# Patient Record
Sex: MALE | Race: NATIVE HAWAIIAN OR OTHER PACIFIC ISLANDER | Employment: UNEMPLOYED | URBAN - METROPOLITAN AREA
[De-identification: names, ages, dates, MRNs, and addresses within clinical notes are randomized per-mention and may not be internally consistent; named-entity substitution may affect disease eponyms.]

---

## 2021-09-11 ENCOUNTER — HOSPITAL ENCOUNTER (INPATIENT)
Age: 46
LOS: 2 days | Discharge: LEFT BEFORE DISCHARGE | DRG: 383 | End: 2021-09-14
Attending: INTERNAL MEDICINE | Admitting: INTERNAL MEDICINE
Payer: MEDICAID

## 2021-09-11 DIAGNOSIS — L03.113 CELLULITIS OF RIGHT UPPER EXTREMITY: Primary | ICD-10-CM

## 2021-09-11 PROCEDURE — 96368 THER/DIAG CONCURRENT INF: CPT

## 2021-09-11 PROCEDURE — 96375 TX/PRO/DX INJ NEW DRUG ADDON: CPT

## 2021-09-11 PROCEDURE — 96365 THER/PROPH/DIAG IV INF INIT: CPT

## 2021-09-11 PROCEDURE — 99284 EMERGENCY DEPT VISIT MOD MDM: CPT

## 2021-09-11 ASSESSMENT — PAIN DESCRIPTION - LOCATION: LOCATION: HAND

## 2021-09-11 ASSESSMENT — PAIN SCALES - GENERAL: PAINLEVEL_OUTOF10: 10

## 2021-09-11 ASSESSMENT — PAIN DESCRIPTION - DESCRIPTORS: DESCRIPTORS: SHARP;ACHING;PRESSURE

## 2021-09-11 ASSESSMENT — PAIN DESCRIPTION - ORIENTATION: ORIENTATION: RIGHT

## 2021-09-11 ASSESSMENT — PAIN DESCRIPTION - PAIN TYPE: TYPE: ACUTE PAIN

## 2021-09-11 ASSESSMENT — PAIN DESCRIPTION - FREQUENCY: FREQUENCY: CONTINUOUS

## 2021-09-12 ENCOUNTER — APPOINTMENT (OUTPATIENT)
Dept: MRI IMAGING | Age: 46
DRG: 383 | End: 2021-09-12
Payer: MEDICAID

## 2021-09-12 ENCOUNTER — APPOINTMENT (OUTPATIENT)
Dept: CT IMAGING | Age: 46
DRG: 383 | End: 2021-09-12
Payer: MEDICAID

## 2021-09-12 ENCOUNTER — APPOINTMENT (OUTPATIENT)
Dept: GENERAL RADIOLOGY | Age: 46
DRG: 383 | End: 2021-09-12
Payer: MEDICAID

## 2021-09-12 PROBLEM — L02.511 ABSCESS OF RIGHT HAND: Status: ACTIVE | Noted: 2021-09-12

## 2021-09-12 PROBLEM — M86.141 ACUTE OSTEOMYELITIS OF RIGHT HAND (HCC): Status: ACTIVE | Noted: 2021-09-12

## 2021-09-12 LAB
ALBUMIN SERPL-MCNC: 5.1 G/DL (ref 3.5–4.6)
ALP BLD-CCNC: 130 U/L (ref 35–104)
ALT SERPL-CCNC: 22 U/L (ref 0–41)
ANION GAP SERPL CALCULATED.3IONS-SCNC: 12 MEQ/L (ref 9–15)
AST SERPL-CCNC: 20 U/L (ref 0–40)
BASOPHILS ABSOLUTE: 0.1 K/UL (ref 0–0.2)
BASOPHILS RELATIVE PERCENT: 0.4 %
BILIRUB SERPL-MCNC: 0.3 MG/DL (ref 0.2–0.7)
BUN BLDV-MCNC: 14 MG/DL (ref 6–20)
CALCIUM SERPL-MCNC: 9.8 MG/DL (ref 8.5–9.9)
CHLORIDE BLD-SCNC: 99 MEQ/L (ref 95–107)
CO2: 28 MEQ/L (ref 20–31)
CREAT SERPL-MCNC: 0.76 MG/DL (ref 0.7–1.2)
EOSINOPHILS ABSOLUTE: 0.3 K/UL (ref 0–0.7)
EOSINOPHILS RELATIVE PERCENT: 1.7 %
GFR AFRICAN AMERICAN: >60
GFR NON-AFRICAN AMERICAN: >60
GLOBULIN: 3.4 G/DL (ref 2.3–3.5)
GLUCOSE BLD-MCNC: 93 MG/DL (ref 70–99)
HAV IGM SER IA-ACNC: ABNORMAL
HCT VFR BLD CALC: 43.4 % (ref 42–52)
HEMOGLOBIN: 14.6 G/DL (ref 14–18)
HEPATITIS B CORE IGM ANTIBODY: ABNORMAL
HEPATITIS B SURFACE ANTIGEN INTERPRETATION: ABNORMAL
HEPATITIS C ANTIBODY INTERPRETATION: REACTIVE
HEPATITIS INTERPRETATION:: ABNORMAL
LACTIC ACID: 1.1 MMOL/L (ref 0.5–2.2)
LYMPHOCYTES ABSOLUTE: 1.6 K/UL (ref 1–4.8)
LYMPHOCYTES RELATIVE PERCENT: 10.6 %
MCH RBC QN AUTO: 26.8 PG (ref 27–31.3)
MCHC RBC AUTO-ENTMCNC: 33.6 % (ref 33–37)
MCV RBC AUTO: 79.7 FL (ref 80–100)
MONOCYTES ABSOLUTE: 1 K/UL (ref 0.2–0.8)
MONOCYTES RELATIVE PERCENT: 6.9 %
NEUTROPHILS ABSOLUTE: 12.2 K/UL (ref 1.4–6.5)
NEUTROPHILS RELATIVE PERCENT: 80.4 %
PDW BLD-RTO: 14.1 % (ref 11.5–14.5)
PLATELET # BLD: 275 K/UL (ref 130–400)
POTASSIUM SERPL-SCNC: 4.3 MEQ/L (ref 3.4–4.9)
RBC # BLD: 5.44 M/UL (ref 4.7–6.1)
SODIUM BLD-SCNC: 139 MEQ/L (ref 135–144)
TOTAL PROTEIN: 8.5 G/DL (ref 6.3–8)
WBC # BLD: 15.1 K/UL (ref 4.8–10.8)

## 2021-09-12 PROCEDURE — 6360000004 HC RX CONTRAST MEDICATION: Performed by: INTERNAL MEDICINE

## 2021-09-12 PROCEDURE — 1210000000 HC MED SURG R&B

## 2021-09-12 PROCEDURE — 6370000000 HC RX 637 (ALT 250 FOR IP): Performed by: INTERNAL MEDICINE

## 2021-09-12 PROCEDURE — 73201 CT UPPER EXTREMITY W/DYE: CPT

## 2021-09-12 PROCEDURE — 93005 ELECTROCARDIOGRAM TRACING: CPT | Performed by: INTERNAL MEDICINE

## 2021-09-12 PROCEDURE — 80074 ACUTE HEPATITIS PANEL: CPT

## 2021-09-12 PROCEDURE — 6360000002 HC RX W HCPCS: Performed by: INTERNAL MEDICINE

## 2021-09-12 PROCEDURE — 2580000003 HC RX 258: Performed by: INTERNAL MEDICINE

## 2021-09-12 PROCEDURE — 2580000003 HC RX 258: Performed by: PHYSICIAN ASSISTANT

## 2021-09-12 PROCEDURE — 83605 ASSAY OF LACTIC ACID: CPT

## 2021-09-12 PROCEDURE — 85025 COMPLETE CBC W/AUTO DIFF WBC: CPT

## 2021-09-12 PROCEDURE — 73130 X-RAY EXAM OF HAND: CPT

## 2021-09-12 PROCEDURE — 36415 COLL VENOUS BLD VENIPUNCTURE: CPT

## 2021-09-12 PROCEDURE — 87040 BLOOD CULTURE FOR BACTERIA: CPT

## 2021-09-12 PROCEDURE — 80053 COMPREHEN METABOLIC PANEL: CPT

## 2021-09-12 PROCEDURE — 6360000002 HC RX W HCPCS: Performed by: PHYSICIAN ASSISTANT

## 2021-09-12 RX ORDER — ONDANSETRON 4 MG/1
4 TABLET, ORALLY DISINTEGRATING ORAL EVERY 8 HOURS PRN
Status: DISCONTINUED | OUTPATIENT
Start: 2021-09-12 | End: 2021-09-14 | Stop reason: HOSPADM

## 2021-09-12 RX ORDER — MORPHINE SULFATE 2 MG/ML
1 INJECTION, SOLUTION INTRAMUSCULAR; INTRAVENOUS EVERY 4 HOURS PRN
Status: DISCONTINUED | OUTPATIENT
Start: 2021-09-12 | End: 2021-09-14 | Stop reason: HOSPADM

## 2021-09-12 RX ORDER — ONDANSETRON 2 MG/ML
4 INJECTION INTRAMUSCULAR; INTRAVENOUS EVERY 6 HOURS PRN
Status: DISCONTINUED | OUTPATIENT
Start: 2021-09-12 | End: 2021-09-14 | Stop reason: HOSPADM

## 2021-09-12 RX ORDER — ACETAMINOPHEN 650 MG/1
650 SUPPOSITORY RECTAL EVERY 6 HOURS PRN
Status: DISCONTINUED | OUTPATIENT
Start: 2021-09-12 | End: 2021-09-14 | Stop reason: HOSPADM

## 2021-09-12 RX ORDER — MORPHINE SULFATE 2 MG/ML
2 INJECTION, SOLUTION INTRAMUSCULAR; INTRAVENOUS EVERY 4 HOURS PRN
Status: DISCONTINUED | OUTPATIENT
Start: 2021-09-12 | End: 2021-09-14 | Stop reason: HOSPADM

## 2021-09-12 RX ORDER — FENTANYL CITRATE 50 UG/ML
50 INJECTION, SOLUTION INTRAMUSCULAR; INTRAVENOUS ONCE
Status: COMPLETED | OUTPATIENT
Start: 2021-09-12 | End: 2021-09-12

## 2021-09-12 RX ORDER — ACETAMINOPHEN 325 MG/1
650 TABLET ORAL EVERY 6 HOURS PRN
Status: DISCONTINUED | OUTPATIENT
Start: 2021-09-12 | End: 2021-09-14 | Stop reason: HOSPADM

## 2021-09-12 RX ORDER — POLYETHYLENE GLYCOL 3350 17 G/17G
17 POWDER, FOR SOLUTION ORAL DAILY PRN
Status: DISCONTINUED | OUTPATIENT
Start: 2021-09-12 | End: 2021-09-14 | Stop reason: HOSPADM

## 2021-09-12 RX ADMIN — VANCOMYCIN HYDROCHLORIDE 1000 MG: 1 INJECTION, POWDER, LYOPHILIZED, FOR SOLUTION INTRAVENOUS at 01:10

## 2021-09-12 RX ADMIN — VANCOMYCIN HYDROCHLORIDE 1000 MG: 1 INJECTION, POWDER, LYOPHILIZED, FOR SOLUTION INTRAVENOUS at 16:14

## 2021-09-12 RX ADMIN — ACETAMINOPHEN 650 MG: 325 TABLET ORAL at 23:19

## 2021-09-12 RX ADMIN — PIPERACILLIN AND TAZOBACTAM 3375 MG: 3; .375 INJECTION, POWDER, LYOPHILIZED, FOR SOLUTION INTRAVENOUS at 02:00

## 2021-09-12 RX ADMIN — FENTANYL CITRATE 50 MCG: 50 INJECTION, SOLUTION INTRAMUSCULAR; INTRAVENOUS at 01:20

## 2021-09-12 RX ADMIN — MORPHINE SULFATE 2 MG: 2 INJECTION, SOLUTION INTRAMUSCULAR; INTRAVENOUS at 06:15

## 2021-09-12 RX ADMIN — IOPAMIDOL 100 ML: 755 INJECTION, SOLUTION INTRAVENOUS at 07:32

## 2021-09-12 RX ADMIN — PIPERACILLIN AND TAZOBACTAM 3375 MG: 3; .375 INJECTION, POWDER, LYOPHILIZED, FOR SOLUTION INTRAVENOUS at 12:07

## 2021-09-12 RX ADMIN — PIPERACILLIN AND TAZOBACTAM 3375 MG: 3; .375 INJECTION, POWDER, LYOPHILIZED, FOR SOLUTION INTRAVENOUS at 20:58

## 2021-09-12 RX ADMIN — MORPHINE SULFATE 2 MG: 2 INJECTION, SOLUTION INTRAMUSCULAR; INTRAVENOUS at 20:58

## 2021-09-12 ASSESSMENT — PAIN SCALES - GENERAL
PAINLEVEL_OUTOF10: 10
PAINLEVEL_OUTOF10: 8
PAINLEVEL_OUTOF10: 9
PAINLEVEL_OUTOF10: 10

## 2021-09-12 ASSESSMENT — ENCOUNTER SYMPTOMS
ANAL BLEEDING: 0
VOICE CHANGE: 0
APNEA: 0
ABDOMINAL DISTENTION: 0
COLOR CHANGE: 1
EYE DISCHARGE: 0
NAUSEA: 0
COUGH: 0
PHOTOPHOBIA: 0
VOMITING: 0
SHORTNESS OF BREATH: 0

## 2021-09-12 NOTE — CARE COORDINATION
MET WITH PT. HE STATES HE HAS BEEN HERE FROM [facility]. HE DECLINES LETS GET REAL FROM THIS CM, BUT PHYSICIAN DISCUSSED WITH PT AND HE ACCEPTED. PAMPHLET PLACED AT BEDSIDE. PT STATES HE HAS NO ID AND CANNOT GET ASSISTANCE BECAUSE HE LOST HIS WALLET AND HAS NO SS CARD. HE IS NOT EMPLOYED. PT HAS EYES CLOSED WHEN SPEAKING WITH THIS CM AND REFUSES . PT SPEAKS ENGLISH WITH ACCENT. PT WILL BE NPO THIS MIDNIGHT PER ORDER. PROCEDURE TOMORROW. POSSIBLE LTAB NEEDED WITH PICC LINE. PT IS AN IV DRUG USER SO WILL BENEFIT FROM PLACEMENT. LEFT MSG FOR HELP FOR POSSIBLE MEDICAID STEPH. Carolina Hendricks Case Management Initial Discharge Assessment    Met with Patient to discuss discharge plan. PCP: No primary care provider on file. Date of Last Visit: N/A    If no PCP, list provided? Yes    Discharge Planning    Living Arrangements: independently at home    Who do you live with? MOM    Who helps you with your care:  self    If lives at home:     Do you have any barriers navigating in your home? no    Patient can perform ADL? Yes    Current Services (outpatient and in home) :  None    Dialysis: No    Is transportation available to get to your appointments? Yes, HE CAN GET RIDES FROM FAMILY    DME Equipment:  no    Respiratory equipment: None    Respiratory provider:  no     Pharmacy:  no    Consult with Medication Assistance Program?  Yes      Patient agreeable to [facility]? Declined    Patient agreeable to SNF/Rehab? Yes, Company LIST GIVEN    Other discharge needs identified? HELP program    Does Patient Have a High-Risk for Readmission Diagnosis (CHF, PN, MI, COPD)? No    Initial Discharge Plan? (Note: please see concurrent daily documentation for any updates after initial note). MONITOR FOR ID PLAN. HOME W/MOM UNLESS [address] ARE NEEDED. LETS GET REAL GIVEN. HELP NOTIFIED. PCP LIST GIVEN. SNF LIST GIVEN.  PT WILL NEED MEDICATION ASSISTANCE AS WELL AS A PHARMACY IF DISCHARGES HOME.      Readmission Risk              Risk of Unplanned Readmission:  7         Electronically signed by Jessica Elias RN on 9/12/2021 at 2:58 PM

## 2021-09-12 NOTE — PLAN OF CARE
Problem: Discharge Planning:  Goal: Discharged to appropriate level of care  Description: Discharged to appropriate level of care  Outcome: Ongoing     Problem:  Body Temperature - Imbalanced:  Goal: Ability to maintain a body temperature in the normal range will improve  Description: Ability to maintain a body temperature in the normal range will improve  Outcome: Ongoing     Problem: Pain:  Goal: Pain level will decrease  Description: Pain level will decrease  Outcome: Ongoing  Goal: Control of acute pain  Description: Control of acute pain  Outcome: Ongoing  Goal: Control of chronic pain  Description: Control of chronic pain  Outcome: Ongoing     Problem: Skin Integrity - Impaired:  Goal: Will show no infection signs and symptoms  Description: Will show no infection signs and symptoms  Outcome: Ongoing  Goal: Absence of new skin breakdown  Description: Absence of new skin breakdown  Outcome: Ongoing

## 2021-09-12 NOTE — PROGRESS NOTES
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 Laura Robles to manage therapy. Also receiving the following antibiotics: Zosyn. Acute osteomyelitis of right hand ([address] [M86.141]  Cellulitis of right upper extremity [L03.113]  Allergies: Patient has no known allergies. Temp max: 98.6F    Cultures  No results for input(s): BC, BLOODCULT2, MRSAC, RESPCULTURE, LABGRAM, ORG, CXSURG, WNDABS, LABANAE, LABURIN, SPNEUAGU, HSVSRC, FLUA, FLUB, AFBSMEAR, CXST, FUNGUSSMEAR, LABLEGI, VRECX, CDIFPCR, XNYBO459, GIAAGS, ROTA, FECLEU, COVID19 in the last 720 hours. Height: 5' 6\" (167.6 cm), Weight: 140 lb (63.5 kg), Body mass index is 22.6 kg/m². MRSA Nasal swab:N/a, does not meet criteria    Recent Labs     09/12/21  0015   CREATININE 0.76   Estimated Creatinine Clearance: 108 mL/min (based on SCr of 0.76 mg/dL). .    Goal Trough Level: 15 mcg/mL, -600    Assessment/Plan:  Given Vancomycin one time dose of 1000 mg in ER. Will continue with 1000 mg IV every 12 hours. Bayesian model predicts steady state , trough 14, Pauc 69%, Pconc 23%, tox 9%. Anticipate random level 9/13 with morning labs. Timing of future trough levels may be adjusted based on culture results, renal function, and clinical response. Thank you for the consult. Will continue to follow. MARV Mckenzie. Ph.  9/12/2021  5:11 AM

## 2021-09-12 NOTE — H&P
Hospital Medicine  History and Physical    Patient:  Alanda Saint  MRN: [id number]    CHIEF COMPLAINT:    Chief Complaint   Patient presents with    Joint Swelling     right hand at heroin injection site        History Obtained From:  patient, electronic medical record  Primary Care Physician: No primary care provider on file. HISTORY OF PRESENT ILLNESS:   The patient is a 51 y.o. male who presents with days of worsening swelling edema pain to the right patient is an avid heroin user usually injects into the right hand he states that he has had swelling and erythema for several days and when the pain was no longer controlled with heroin he came back to the ER for evaluation. On arrival to the ED basic metabolic panel is benign LFTs are benign however he did have some leukocytosis of 15.1. Blood cultures were ordered and patient was admitted to the medical service. History is limited as patient is quite lethargic after receiving fentanyl for pain control. Patient does admit to IV drug use, fevers, pain. Admits to tobacco use denies alcohol abuse admits to drug abuse including heroin cocaine     Past Medical History:      Diagnosis Date    Drug abuse ([facility]     Hep C w/o coma, chronic ([facility]        Past Surgical History:      Procedure Laterality Date    HERNIA REPAIR         Medications Prior to Admission:    Prior to Admission medications    Not on File       Allergies:  Patient has no known allergies. Social History:   TOBACCO:   reports that he has been smoking cigarettes. He has been smoking about 1.00 pack per day. He has never used smokeless tobacco.  ETOH:   reports previous alcohol use. OCCUPATION: None    Family History:   History reviewed. No pertinent family history. REVIEW OF SYSTEMS:  Ten systems reviewed and negative except for as above.      Physical Exam:    Vitals: /70   Pulse 66   Temp 98.6 °F (37 °C)   Resp 18   Ht 5' 6\" (1.676 m)   Wt 140 lb (63.5 kg)   SpO2 99%   BMI 22.60 kg/m²   Constitutional: alert, appears stated age and cooperative, thin mild respiratory distress  Skin: Skin color, texture, turgor normal. No rashes or lesions  Eyes:Eye: Normal external eye, conjunctiva, LV. ENT: Head: Normocephalic, no lesions, without obvious abnormality. Neck: no adenopathy, no carotid bruit, no JVD, supple, symmetrical, trachea midline and thyroid not enlarged, symmetric, no tenderness/mass/nodules  Respiratory: Expiratory wheezes otherwise clear to auscultation with good air movement  Cardiovascular: regular rate and rhythm, S1, S2 normal, no murmur, click, rub or gallop  Gastrointestinal: soft, non-tender; bowel sounds normal; no masses,  no organomegaly  Genitourinary: Deferred  Musculoskeletal: Profound swelling erythema and redness to the right forearm and dorsum of the hand. Pulses intact. Good capillary refill. Intact range of motion though limited secondary swelling  Neurologic: Mental status AAOx3 No facial asymmetry or droop. Normal muscle strength b/l. Psychiatric: Appropriate mood and affect. Good insight and judgement  Hematologic: No obvious bruising or bleeding    Recent Labs     09/12/21  0015   WBC 15.1*   HGB 14.6        Recent Labs     09/12/21  0015      K 4.3   CL 99   CO2 28   BUN 14   CREATININE 0.76   GLUCOSE 93   AST 20   ALT 22   BILITOT 0.3   ALKPHOS 130*         EKG: ordered    Assessment and Plan   1. Right hand swelling with concern for osteomyelitis following IV drug use: Concern for high risk of developing compartment syndrome. CT right forearm and hand. Consult orthopedic surgery. Morphine as needed pain control. Blood cultures ordered. Empiric coverage with vancomycin and Zosyn  2. IV drug use with heroin abuse: Encouraged cessation. 3. Hep C repeat LFT  4.  DVT prophylaxis SCDs    Patient Active Problem List   Diagnosis Code    [facility] M86.141       Zachary Carter DO  Admitting Hospitalist    Emergency Contact:

## 2021-09-12 NOTE — ED PROVIDER NOTES
[facility] ED  eMERGENCY dEPARTMENT eNCOUnter      Pt Name: Fina Donis  MRN: [id number]  [place] 1975  Date of evaluation: 9/11/2021  Provider: José Jacobsen Dr       Chief Complaint   Patient presents with    Joint Swelling     right hand at heroin injection site          HISTORY OF PRESENT ILLNESS   (Location/Symptom, Timing/Onset,Context/Setting, Quality, Duration, Modifying Factors, Severity)  Note limiting factors. Fina Donis is a 51 y.o. male who presents to the emergency department patient presents with right hand swelling arm swelling pain redness at injection sites has been injecting heroin. Has had this for 4 days. Patient denies fever chills nausea vomiting. We discussed that he needs to discontinue all recreational substance any heroin as this could worsen significantly. Symptoms moderate to severe in severity nothing improves or worsen symptoms. HPI    NursingNotes were reviewed. REVIEW OF SYSTEMS    (2-9 systems for level 4, 10 or more for level 5)     Review of Systems   Constitutional: Negative for activity change, appetite change, chills, fever and unexpected weight change. HENT: Negative for ear discharge, nosebleeds and voice change. Eyes: Negative for photophobia and discharge. Respiratory: Negative for apnea, cough and shortness of breath. Cardiovascular: Negative for chest pain. Gastrointestinal: Negative for abdominal distention, anal bleeding, nausea and vomiting. Endocrine: Negative for cold intolerance, heat intolerance and polyphagia. Genitourinary: Negative for dysuria and genital sores. Musculoskeletal: Negative for joint swelling. Skin: Positive for color change and wound. Negative for pallor. Allergic/Immunologic: Negative for immunocompromised state. Neurological: Negative for seizures and facial asymmetry. Hematological: Does not bruise/bleed easily.    Psychiatric/Behavioral: Negative for behavioral problems, self-injury and sleep disturbance. All other systems reviewed and are negative. Except as noted above the remainder of the review of systems was reviewed and negative. PAST MEDICAL HISTORY     Past Medical History:   Diagnosis Date    [facility]     Hep C w/o coma, chronic ([facility]          SURGICALHISTORY       Past Surgical History:   Procedure Laterality Date    HERNIA REPAIR           CURRENT MEDICATIONS       Previous Medications    No medications on file            Patient has no known allergies. FAMILY HISTORY     History reviewed. No pertinent family history. SOCIAL HISTORY       Social History     Socioeconomic History    Marital status: Single     Spouse name: None    Number of children: None    Years of education: None    Highest education level: None   Occupational History    None   Tobacco Use    Smoking status: Current Every Day Smoker     Packs/day: 1.00     Types: Cigarettes    Smokeless tobacco: Never Used   Substance and Sexual Activity    Alcohol use: Not Currently    Drug use: Yes     Frequency: 7.0 times per week     Types: IV, Cocaine, Opiates     Sexual activity: None   Other Topics Concern    None   Social History Narrative    None     Social Determinants of Health     Financial Resource Strain:     Difficulty of Paying Living Expenses:    Food Insecurity:     Worried About Running Out of Food in the Last Year:     Ran Out of Food in the Last Year:    Transportation Needs:     Lack of Transportation (Medical):      Lack of Transportation (Non-Medical):    Physical Activity:     Days of Exercise per Week:     Minutes of Exercise per Session:    Stress:     Feeling of Stress :    Social Connections:     Frequency of Communication with Friends and Family:     Frequency of Social Gatherings with Friends and Family:     Attends Rastafari Services:     Active Member of Clubs or Organizations:     Attends Club or Organization Meetings:     Marital Status:    Intimate Partner Violence:     Fear of Current or Ex-Partner:     Emotionally Abused:     Physically Abused:     Sexually Abused:        SCREENINGS   Ebola Virus Disease (EVD) Screening   Temp: 98.3 °F (36.8 °C)  CIWA Assessment  BP: (!) 153/90  Pulse: 74    PHYSICAL EXAM    (up to 7 for level 4, 8 or more for level 5)     ED Triage Vitals [09/11/21 2334]   BP Temp Temp Source Pulse Resp SpO2 Height Weight   (!) 153/90 98.3 °F (36.8 °C) Oral 74 20 99 % 5' 6\" (1.676 m) 140 lb (63.5 kg)       Physical Exam  Vitals and nursing note reviewed. Constitutional:       General: He is not in acute distress. Appearance: He is well-developed. HENT:      Head: Normocephalic and atraumatic. Right Ear: External ear normal.      Left Ear: External ear normal.      Nose: Nose normal.      Mouth/Throat:      Mouth: Mucous membranes are moist.   Eyes:      General:         Right eye: No discharge. Left eye: No discharge. Pupils: Pupils are equal, round, and reactive to light. Cardiovascular:      Rate and Rhythm: Normal rate and regular rhythm. Heart sounds: Normal heart sounds. Pulmonary:      Effort: Pulmonary effort is normal. No respiratory distress. Breath sounds: Normal breath sounds. No stridor. Abdominal:      General: Bowel sounds are normal. There is no distension. Palpations: Abdomen is soft. Musculoskeletal:         General: Tenderness present. Cervical back: Normal range of motion and neck supple. Skin:     General: Skin is warm. Findings: Erythema present. Comments: Positive erythema edema right hand wrist and distal forearm. Noted injection sites overlying first met. Negative drainable fluid collection noted. Neurological:      Mental Status: He is alert and oriented to person, place, and time.    Psychiatric:         Mood and Affect: Mood normal.         RESULTS     EKG: All EKG's are interpreted by the Emergency Department Physician who either signs or Co-signsthis chart in the absence of a cardiologist.         RADIOLOGY:   Margarita Everts such as CT, Ultrasound and MRI are read by the radiologist. Plain radiographic images are visualized and preliminarily interpreted by the emergency physician with the below findings:     see rad report    Interpretation per the Radiologist below, if available at the time ofthis note:    XR HAND RIGHT (MIN 3 VIEWS)    (Results Pending)         ED BEDSIDE ULTRASOUND:   Performed by ED Physician - none    LABS:  Labs Reviewed   CBC WITH AUTO DIFFERENTIAL - Abnormal; Notable for the following components:       Result Value    WBC 15.1 (*)     MCV 79.7 (*)     MCH 26.8 (*)     Neutrophils Absolute 12.2 (*)     Monocytes Absolute 1.0 (*)     All other components within normal limits   CULTURE, BLOOD 1   CULTURE, BLOOD 2   LACTIC ACID, PLASMA   COMPREHENSIVE METABOLIC PANEL       All other labs were within normal range or not returned as of this dictation. EMERGENCY DEPARTMENT COURSE and DIFFERENTIAL DIAGNOSIS/MDM:   Vitals:    Vitals:    09/11/21 2334   BP: (!) 153/90   Pulse: 74   Resp: 20   Temp: 98.3 °F (36.8 °C)   TempSrc: Oral   SpO2: 99%   Weight: 140 lb (63.5 kg)   Height: 5' 6\" (1.676 m)             MDM  Number of Diagnoses or Management Options  Cellulitis of right upper extremity  Diagnosis management comments: Discussed with patient need to discontinue all recreational substances as he may lose life and limb including possible loss of arm possible loss of life possible to lose the ability to perform activities of daily living.   Discussed with Dr. Sharyle Lansing will admit, labs pending       Amount and/or Complexity of Data Reviewed  Clinical lab tests: ordered  Tests in the radiology section of CPT®: ordered  Discuss the patient with other providers: yes        CRITICAL CARE TIME       CONSULTS:  None    PROCEDURES:  Unless otherwise noted below, none Procedures    FINAL IMPRESSION      1. Cellulitis of right upper extremity          DISPOSITION/PLAN   DISPOSITION Decision To Admit 09/12/2021 01:39:10 AM      PATIENT REFERRED TO:  No follow-up provider specified.     DISCHARGE MEDICATIONS:  New Prescriptions    No medications on file          (Please note that portions of this note were completed with a voice recognition program.  Efforts were made to edit the dictations but occasionally words are mis-transcribed.)    Abdirizak Tai PA-C (electronically signed)  Attending Emergency Physician        Abdirizak Tai PA-C  09/12/21 0159

## 2021-09-12 NOTE — PROGRESS NOTES
Patient currently withdrawing from heroin use with small abscess to the right hand. Recommend Continued IV antibiotic coverage and NPO at midnight to allow for more localization of the abscess for possibility to drainage tomorrow. Please make patient NPO at midnight.

## 2021-09-12 NOTE — ED TRIAGE NOTES
Pt states that he has had swelling for 3-4 days and his right hand at a heroin injection site. Noted area is hot to the touch, red, and swollen.

## 2021-09-12 NOTE — PROGRESS NOTES
Received a call from lab tech, patient refusing labs, says he does not want anyone poking him. Sent [employer] message to physician to make aware.  Electronically signed by Rena Webb RN on 9/12/2021 at 2:18 PM

## 2021-09-12 NOTE — FLOWSHEET NOTE
Pt lethargic, only responds to voice.  When responding he is irritable and cries -KG     Electronically signed by Bernice Sotelo RN on 9/12/2021

## 2021-09-12 NOTE — PROGRESS NOTES
Hospitalist brief progress update note  Date of service: 9/12/2021 (same DOS as H&P)    Updates since admission:  -CT hand forearm demonstrating moderately extensive cellulitis, 2 cm abscess above first MCP, no clear evidence concerning for osteomyelitis at this time. -Mention of possible hep C in documentation during admission. Will order acute hepatitis panel to screen. -Vital signs stable on room air this morning. Afebrile overnight. Awaiting orthopedic surgery consult/recommendations.  -Patient refusing 6am lab draws. Phlebotomy came back multiple times, but refused each time. Admit labs occurred after midnight, with none sufficiently out of normal range to need rechecked urgently the same morning. Will defer lab collection until next AM (9/13), at which time labs will need to be drawn (e.g. CBC, BMP, Vanco level for dose adjustment). -Patient upset about NPO status. Ortho planning for NPO after midnight, so diet given for today up until midnight.       Electronically signed by Val Khalil DO on 9/12/2021 at 3:10 PM

## 2021-09-13 ENCOUNTER — ANESTHESIA EVENT (OUTPATIENT)
Dept: OPERATING ROOM | Age: 46
DRG: 383 | End: 2021-09-13
Payer: MEDICAID

## 2021-09-13 ENCOUNTER — ANESTHESIA (OUTPATIENT)
Dept: OPERATING ROOM | Age: 46
DRG: 383 | End: 2021-09-13
Payer: MEDICAID

## 2021-09-13 VITALS — OXYGEN SATURATION: 99 % | SYSTOLIC BLOOD PRESSURE: 99 MMHG | TEMPERATURE: 98.6 F | DIASTOLIC BLOOD PRESSURE: 56 MMHG

## 2021-09-13 LAB
BILIRUBIN URINE: NEGATIVE
BLOOD, URINE: NEGATIVE
CLARITY: CLEAR
COLOR: YELLOW
EKG ATRIAL RATE: 64 BPM
EKG P AXIS: 17 DEGREES
EKG P-R INTERVAL: 150 MS
EKG Q-T INTERVAL: 450 MS
EKG QRS DURATION: 90 MS
EKG QTC CALCULATION (BAZETT): 464 MS
EKG R AXIS: 22 DEGREES
EKG T AXIS: 2 DEGREES
EKG VENTRICULAR RATE: 64 BPM
GLUCOSE URINE: NEGATIVE MG/DL
KETONES, URINE: NEGATIVE MG/DL
LEUKOCYTE ESTERASE, URINE: NEGATIVE
NITRITE, URINE: NEGATIVE
PH UA: 7 (ref 5–9)
PROTEIN UA: NEGATIVE MG/DL
SARS-COV-2, NAAT: NOT DETECTED
SPECIFIC GRAVITY UA: 1.01 (ref 1–1.03)
UROBILINOGEN, URINE: 1 E.U./DL
VANCOMYCIN RANDOM: 9.4 UG/ML (ref 10–40)

## 2021-09-13 PROCEDURE — 93010 ELECTROCARDIOGRAM REPORT: CPT | Performed by: INTERNAL MEDICINE

## 2021-09-13 PROCEDURE — 0H9FXZZ DRAINAGE OF RIGHT HAND SKIN, EXTERNAL APPROACH: ICD-10-PCS | Performed by: ORTHOPAEDIC SURGERY

## 2021-09-13 PROCEDURE — 81003 URINALYSIS AUTO W/O SCOPE: CPT

## 2021-09-13 PROCEDURE — 6360000002 HC RX W HCPCS: Performed by: ANESTHESIOLOGY

## 2021-09-13 PROCEDURE — 80202 ASSAY OF VANCOMYCIN: CPT

## 2021-09-13 PROCEDURE — 87635 SARS-COV-2 COVID-19 AMP PRB: CPT

## 2021-09-13 PROCEDURE — 3700000001 HC ADD 15 MINUTES (ANESTHESIA): Performed by: ORTHOPAEDIC SURGERY

## 2021-09-13 PROCEDURE — 36415 COLL VENOUS BLD VENIPUNCTURE: CPT

## 2021-09-13 PROCEDURE — 2580000003 HC RX 258: Performed by: ANESTHESIOLOGY

## 2021-09-13 PROCEDURE — 6360000002 HC RX W HCPCS: Performed by: INTERNAL MEDICINE

## 2021-09-13 PROCEDURE — 99251 PR INITL INPATIENT CONSULT NEW/ESTAB PT 20 MIN: CPT | Performed by: ORTHOPAEDIC SURGERY

## 2021-09-13 PROCEDURE — 87070 CULTURE OTHR SPECIMN AEROBIC: CPT

## 2021-09-13 PROCEDURE — 87075 CULTR BACTERIA EXCEPT BLOOD: CPT

## 2021-09-13 PROCEDURE — 10061 I&D ABSCESS COMP/MULTIPLE: CPT | Performed by: ORTHOPAEDIC SURGERY

## 2021-09-13 PROCEDURE — 87205 SMEAR GRAM STAIN: CPT

## 2021-09-13 PROCEDURE — 7100000000 HC PACU RECOVERY - FIRST 15 MIN: Performed by: ORTHOPAEDIC SURGERY

## 2021-09-13 PROCEDURE — 99254 IP/OBS CNSLTJ NEW/EST MOD 60: CPT | Performed by: INTERNAL MEDICINE

## 2021-09-13 PROCEDURE — 1210000000 HC MED SURG R&B

## 2021-09-13 PROCEDURE — 7100000001 HC PACU RECOVERY - ADDTL 15 MIN: Performed by: ORTHOPAEDIC SURGERY

## 2021-09-13 PROCEDURE — 3600000014 HC SURGERY LEVEL 4 ADDTL 15MIN: Performed by: ORTHOPAEDIC SURGERY

## 2021-09-13 PROCEDURE — 2580000003 HC RX 258: Performed by: INTERNAL MEDICINE

## 2021-09-13 PROCEDURE — 2709999900 HC NON-CHARGEABLE SUPPLY: Performed by: ORTHOPAEDIC SURGERY

## 2021-09-13 PROCEDURE — 3600000004 HC SURGERY LEVEL 4 BASE: Performed by: ORTHOPAEDIC SURGERY

## 2021-09-13 PROCEDURE — 87077 CULTURE AEROBIC IDENTIFY: CPT

## 2021-09-13 PROCEDURE — 3700000000 HC ANESTHESIA ATTENDED CARE: Performed by: ORTHOPAEDIC SURGERY

## 2021-09-13 PROCEDURE — 87186 SC STD MICRODIL/AGAR DIL: CPT

## 2021-09-13 RX ORDER — SODIUM CHLORIDE, SODIUM LACTATE, POTASSIUM CHLORIDE, CALCIUM CHLORIDE 600; 310; 30; 20 MG/100ML; MG/100ML; MG/100ML; MG/100ML
INJECTION, SOLUTION INTRAVENOUS CONTINUOUS
Status: DISCONTINUED | OUTPATIENT
Start: 2021-09-13 | End: 2021-09-13

## 2021-09-13 RX ORDER — HYDROCODONE BITARTRATE AND ACETAMINOPHEN 5; 325 MG/1; MG/1
2 TABLET ORAL PRN
Status: DISCONTINUED | OUTPATIENT
Start: 2021-09-13 | End: 2021-09-13 | Stop reason: HOSPADM

## 2021-09-13 RX ORDER — FENTANYL CITRATE 50 UG/ML
50 INJECTION, SOLUTION INTRAMUSCULAR; INTRAVENOUS EVERY 10 MIN PRN
Status: DISCONTINUED | OUTPATIENT
Start: 2021-09-13 | End: 2021-09-13 | Stop reason: HOSPADM

## 2021-09-13 RX ORDER — METOCLOPRAMIDE HYDROCHLORIDE 5 MG/ML
10 INJECTION INTRAMUSCULAR; INTRAVENOUS
Status: DISCONTINUED | OUTPATIENT
Start: 2021-09-13 | End: 2021-09-13 | Stop reason: HOSPADM

## 2021-09-13 RX ORDER — TRAMADOL HYDROCHLORIDE 50 MG/1
50 TABLET ORAL PRN
Status: DISCONTINUED | OUTPATIENT
Start: 2021-09-13 | End: 2021-09-14 | Stop reason: HOSPADM

## 2021-09-13 RX ORDER — CLONIDINE HYDROCHLORIDE 0.1 MG/1
0.1 TABLET ORAL PRN
Status: DISCONTINUED | OUTPATIENT
Start: 2021-09-13 | End: 2021-09-14 | Stop reason: HOSPADM

## 2021-09-13 RX ORDER — MIDAZOLAM HYDROCHLORIDE 2 MG/2ML
INJECTION, SOLUTION INTRAMUSCULAR; INTRAVENOUS PRN
Status: DISCONTINUED | OUTPATIENT
Start: 2021-09-13 | End: 2021-09-13 | Stop reason: SDUPTHER

## 2021-09-13 RX ORDER — FENTANYL CITRATE 50 UG/ML
INJECTION, SOLUTION INTRAMUSCULAR; INTRAVENOUS PRN
Status: DISCONTINUED | OUTPATIENT
Start: 2021-09-13 | End: 2021-09-13 | Stop reason: SDUPTHER

## 2021-09-13 RX ORDER — HYDROCODONE BITARTRATE AND ACETAMINOPHEN 5; 325 MG/1; MG/1
1 TABLET ORAL PRN
Status: DISCONTINUED | OUTPATIENT
Start: 2021-09-13 | End: 2021-09-13 | Stop reason: HOSPADM

## 2021-09-13 RX ORDER — MEPERIDINE HYDROCHLORIDE 25 MG/ML
12.5 INJECTION INTRAMUSCULAR; INTRAVENOUS; SUBCUTANEOUS EVERY 5 MIN PRN
Status: DISCONTINUED | OUTPATIENT
Start: 2021-09-13 | End: 2021-09-13 | Stop reason: HOSPADM

## 2021-09-13 RX ORDER — DIPHENHYDRAMINE HYDROCHLORIDE 50 MG/ML
12.5 INJECTION INTRAMUSCULAR; INTRAVENOUS
Status: DISCONTINUED | OUTPATIENT
Start: 2021-09-13 | End: 2021-09-13 | Stop reason: HOSPADM

## 2021-09-13 RX ORDER — ONDANSETRON 2 MG/ML
4 INJECTION INTRAMUSCULAR; INTRAVENOUS
Status: DISCONTINUED | OUTPATIENT
Start: 2021-09-13 | End: 2021-09-13 | Stop reason: HOSPADM

## 2021-09-13 RX ORDER — PROPOFOL 10 MG/ML
INJECTION, EMULSION INTRAVENOUS PRN
Status: DISCONTINUED | OUTPATIENT
Start: 2021-09-13 | End: 2021-09-13 | Stop reason: SDUPTHER

## 2021-09-13 RX ADMIN — FENTANYL CITRATE 50 MCG: 50 INJECTION, SOLUTION INTRAMUSCULAR; INTRAVENOUS at 18:44

## 2021-09-13 RX ADMIN — FENTANYL CITRATE 50 MCG: 50 INJECTION, SOLUTION INTRAMUSCULAR; INTRAVENOUS at 17:59

## 2021-09-13 RX ADMIN — PIPERACILLIN AND TAZOBACTAM 3375 MG: 3; .375 INJECTION, POWDER, LYOPHILIZED, FOR SOLUTION INTRAVENOUS at 13:39

## 2021-09-13 RX ADMIN — MIDAZOLAM HYDROCHLORIDE 2 MG: 1 INJECTION, SOLUTION INTRAMUSCULAR; INTRAVENOUS at 17:44

## 2021-09-13 RX ADMIN — FENTANYL CITRATE 50 MCG: 50 INJECTION, SOLUTION INTRAMUSCULAR; INTRAVENOUS at 17:55

## 2021-09-13 RX ADMIN — VANCOMYCIN HYDROCHLORIDE 1250 MG: 5 INJECTION, POWDER, LYOPHILIZED, FOR SOLUTION INTRAVENOUS at 14:16

## 2021-09-13 RX ADMIN — PROPOFOL 120 MG: 10 INJECTION, EMULSION INTRAVENOUS at 17:51

## 2021-09-13 RX ADMIN — MORPHINE SULFATE 2 MG: 2 INJECTION, SOLUTION INTRAMUSCULAR; INTRAVENOUS at 10:12

## 2021-09-13 RX ADMIN — PIPERACILLIN AND TAZOBACTAM 3375 MG: 3; .375 INJECTION, POWDER, LYOPHILIZED, FOR SOLUTION INTRAVENOUS at 21:33

## 2021-09-13 RX ADMIN — FENTANYL CITRATE 50 MCG: 50 INJECTION, SOLUTION INTRAMUSCULAR; INTRAVENOUS at 18:33

## 2021-09-13 RX ADMIN — VANCOMYCIN HYDROCHLORIDE 1000 MG: 1 INJECTION, POWDER, LYOPHILIZED, FOR SOLUTION INTRAVENOUS at 01:04

## 2021-09-13 RX ADMIN — MORPHINE SULFATE 2 MG: 2 INJECTION, SOLUTION INTRAMUSCULAR; INTRAVENOUS at 05:00

## 2021-09-13 RX ADMIN — PIPERACILLIN AND TAZOBACTAM 3375 MG: 3; .375 INJECTION, POWDER, LYOPHILIZED, FOR SOLUTION INTRAVENOUS at 05:00

## 2021-09-13 RX ADMIN — MORPHINE SULFATE 2 MG: 2 INJECTION, SOLUTION INTRAMUSCULAR; INTRAVENOUS at 01:03

## 2021-09-13 RX ADMIN — SODIUM CHLORIDE, POTASSIUM CHLORIDE, SODIUM LACTATE AND CALCIUM CHLORIDE 100 ML/HR: 600; 310; 30; 20 INJECTION, SOLUTION INTRAVENOUS at 16:40

## 2021-09-13 ASSESSMENT — PULMONARY FUNCTION TESTS
PIF_VALUE: 4
PIF_VALUE: 1
PIF_VALUE: 3
PIF_VALUE: 2
PIF_VALUE: 3
PIF_VALUE: 3
PIF_VALUE: 4
PIF_VALUE: 3
PIF_VALUE: 4
PIF_VALUE: 4
PIF_VALUE: 1
PIF_VALUE: 3
PIF_VALUE: 4
PIF_VALUE: 2
PIF_VALUE: 3
PIF_VALUE: 2
PIF_VALUE: 1
PIF_VALUE: 4
PIF_VALUE: 1
PIF_VALUE: 3
PIF_VALUE: 4
PIF_VALUE: 0
PIF_VALUE: 4
PIF_VALUE: 3
PIF_VALUE: 3

## 2021-09-13 ASSESSMENT — PAIN SCALES - GENERAL
PAINLEVEL_OUTOF10: 8
PAINLEVEL_OUTOF10: 10
PAINLEVEL_OUTOF10: 8
PAINLEVEL_OUTOF10: 10

## 2021-09-13 ASSESSMENT — PAIN DESCRIPTION - ORIENTATION
ORIENTATION: RIGHT
ORIENTATION: RIGHT

## 2021-09-13 ASSESSMENT — PAIN DESCRIPTION - LOCATION
LOCATION: HAND
LOCATION: HAND

## 2021-09-13 ASSESSMENT — PAIN DESCRIPTION - PAIN TYPE
TYPE: SURGICAL PAIN
TYPE: CHRONIC PAIN

## 2021-09-13 NOTE — OP NOTE
Operative Note      Patient: Sina Burch  YOB: 1975  MRN: [id number]    Date of Procedure: 9/13/2021    Pre-Op Diagnosis: ABSCESS RIGHT HAND    Post-Op Diagnosis: Same       Procedure: Incision and drainage right hand abscess    Surgeon(s):  Christine Nolasco MD    Assistant:   * No surgical staff found *    Anesthesia: Choice    Estimated Blood Loss (mL): Minimal    Complications: None    Specimens:   ID Type Source Tests Collected by Time Destination   1 : culture hand abscess 1 Swab Hand CULTURE, SURGICAL Christine Nolasco MD 9/13/2021 1811    2 : culture hand abscess 2 Swab Hand CULTURE, SURGICAL Christine Nolasco MD 9/13/2021 1813        Implants:  * No implants in log *      Drains: * No LDAs found *    Findings: Complex abscess right hand    Detailed Description of Procedure:   Patient was taken the operating room and placed supine on the operative table. The right upper extremity was prepped and draped as typical for extremity procedure. A timeout was performed, all parties identified, and the correct site was noted. At the conclusion the timeout, the dorsal radial aspect of the right hand at the point of maximal fluctuance was identified. Incision was made through skin which resulted in decompression of grossly purulent fluid. Multiple cultures were sent for further analysis. Loculations were broken up with hemostats that did extend all the way into the proximal portion of the hand. Manual palpation further decompressed the abscess sites. Wound was copiously irrigated with normal saline. No further purulence was able to be expressed at this juncture. Vancomycin powder was infiltrated into the wound. Wound was packed with iodoform gauze and a soft compressive wrap was applied. Patient tolerated procedure well no complications occurred.     Electronically signed by Christine Nolasco MD on 9/13/2021 at 6:23 PM

## 2021-09-13 NOTE — PROGRESS NOTES
Physician Progress Note    2021   4:07 PM    Name:  Kenyetta Oden  MRN:    [id number]     IP Day: 1     Admit Date: 2021 11:37 PM  PCP: No primary care provider on file. Code Status:  Full Code      Assessment and Plan: Active Problems/ diagnosis:     All right upper extremity/right hand abscess and cellulitis  IV drug use  High risk for leaving AGAINST MEDICAL ADVICE-intact and oriented x3 and able to tell me why he is in the hospital and able to explain the risks of leaving without completing the treatment    Plan  · Continue broad-spectrum antibiotic, awaiting infectious disease consult  · I explained to the patient in the presence of pharmacy team that was rounding with me and patient's RN the importance of continuing current medical treatment which includes orthopedic surgery evaluation and possible surgery today. He has been n.p.o. Patient verbalized understanding. I explained the importance of the treatment including preventing worsening in the infection, limb loss, death, organ damage. He verbalized understanding.   · Patient is high risk for leaving AGAINST MEDICAL ADVICE-at this time, I do not have medical or psychiatric reason to hold him against as well if he chooses to leave  [address]  · Orthopedic surgery is following  · Resume as needed pain control    DVT PPx     Subjective:     Wants to leave and the hospital.    Physical Examination:      Vitals:  /86   Pulse 75   Temp 97.9 °F (36.6 °C) (Oral)   Resp 18   Ht 5' 6\" (1.676 m)   Wt 140 lb (63.5 kg)   SpO2 100%   BMI 22.60 kg/m²   Temp (24hrs), Av.9 °F (36.6 °C), Min:97.9 °F (36.6 °C), Max:97.9 °F (36.6 °C)      General appearance: alert, cooperative and no distress  Mental Status: oriented to person, place and time and normal affect  Lungs: clear to auscultation bilaterally, normal effort  Heart: regular rate and rhythm, no murmur  Abdomen: soft, nontender, nondistended, bowel sounds present, no masses  Extremities: no edema, redness, tenderness in the calves  Skin: Right hand erythema and swelling with signs of abscess. Pulses intact.     Data:     Labs:  Recent Labs     09/12/21  0015   WBC 15.1*   HGB 14.6        Recent Labs     09/12/21  0015      K 4.3   CL 99   CO2 28   BUN 14   CREATININE 0.76   GLUCOSE 93     Recent Labs     09/12/21  0015   AST 20   ALT 22   BILITOT 0.3   ALKPHOS 130*       Current Facility-Administered Medications   Medication Dose Route Frequency Provider Last Rate Last Admin    traMADol (ULTRAM) tablet 50 mg  50 mg Oral PRN Jeff Coupe, DO        And    cloNIDine (CATAPRES) tablet 0.1 mg  0.1 mg Oral PRN Everett Lunenburg Dixon, DO        vancomycin (VANCOCIN) 1,250 mg in dextrose 5 % 250 mL IVPB  1,250 mg IntraVENous Q12H Tavo BECK Sedar,  mL/hr at 09/13/21 1416 1,250 mg at 09/13/21 1416    meperidine (DEMEROL) injection 12.5 mg  12.5 mg IntraVENous Q5 Min PRN Acosta Mallory MD        fentaNYL (SUBLIMAZE) injection 50 mcg  50 mcg IntraVENous Q10 Min PRN Acosta Mallory MD        HYDROmorphone (DILAUDID) injection 0.5 mg  0.5 mg IntraVENous Q10 Min PRN Acosta Mallory MD        HYDROcodone-acetaminophen [facility]) 5-325 MG per tablet 1 tablet  1 tablet Oral PRN Acosta Mallory MD        Or    HYDROcodone-acetaminophen [facility]) 5-325 MG per tablet 2 tablet  2 tablet Oral PRN Acosta Mallory MD        ondansetron [facility]) injection 4 mg  4 mg IntraVENous Once PRN Acosta Mallory MD        metoclopramide [facility]) injection 10 mg  10 mg IntraVENous Once PRN Acosta Mallory MD        diphenhydrAMINE (BENADRYL) injection 12.5 mg  12.5 mg IntraVENous Once PRN Acosta Mallory MD        ondansetron (ZOFRAN-ODT) disintegrating tablet 4 mg  4 mg Oral Q8H PRN Yaron Khan DO        Or    ondansetron [facility]) injection 4 mg  4 mg IntraVENous Q6H PRN Yaron Balbir Sedar, DO        polyethylene glycol (GLYCOLAX) packet 17 g  17 g Oral Daily PRN Hermenia Erik Sedar, DO        acetaminophen (TYLENOL) tablet 650 mg  650 mg Oral Q6H PRN Hermenia Erik Sedar, DO   650 mg at 09/12/21 2319    Or    acetaminophen (TYLENOL) suppository 650 mg  650 mg Rectal Q6H PRN Hermenia Erik Sedar, DO        piperacillin-tazobactam (ZOSYN) 3,375 mg in dextrose 5 % 50 mL IVPB extended infusion (mini-bag)  3,375 mg IntraVENous Q8H Tavo D Sedar, DO 12.5 mL/hr at 09/13/21 1339 3,375 mg at 09/13/21 1339    morphine (PF) injection 1 mg  1 mg IntraVENous Q4H PRN Hermenia Erik Sedar, DO        Or    morphine (PF) injection 2 mg  2 mg IntraVENous Q4H PRN Hermenia Erik Sedar, DO   2 mg at 09/13/21 1012    vancomycin (VANCOCIN) intermittent dosing (placeholder)   Other RX Placeholder Hermenia Erik Sedar, DO           Additional work up or/and treatment plan may be added today or then after based on clinical progression. I am managing a portion of pt care. Some medical issues are handled by other specialists. Additional work up and treatment should be done in out pt setting by pt PCP and other out pt providers. In addition to examining and evaluating pt, I spent additional time explaining care, normaland abnormal findings, and treatment plan. All of pt questions were answered. Counseling, diet and education were provided. Case will be discussed with nursing staff when appropriate. Family will be updated if and when appropriate.        Electronically signed by Steve Enciso DO on 9/13/2021 at 4:07 PM

## 2021-09-13 NOTE — ANESTHESIA PRE PROCEDURE
Department of Anesthesiology  Preprocedure Note       Name:  Nadia Hernández   Age:  51 y.o.  :  1975                                          MRN:  [id number]         Date:  2021      Surgeon: Ce Kahn):  Osbaldo Gonsalves MD    Procedure: Procedure(s):  RIGHT HAND INCISION AND DRAINAGE ROOM 279    Medications prior to admission:   Prior to Admission medications    Not on File       Current medications:    Current Facility-Administered Medications   Medication Dose Route Frequency Provider Last Rate Last Admin    traMADol (ULTRAM) tablet 50 mg  50 mg Oral PRN John Diesel, DO        And    cloNIDine (CATAPRES) tablet 0.1 mg  0.1 mg Oral PRN John Diesel, DO        vancomycin (VANCOCIN) 1,250 mg in dextrose 5 % 250 mL IVPB  1,250 mg IntraVENous Q12H Tavo BECK Sedar,  mL/hr at 21 1416 1,250 mg at 21 1416    meperidine (DEMEROL) injection 12.5 mg  12.5 mg IntraVENous Q5 Min PRN Jhonny Chavira MD        fentaNYL (SUBLIMAZE) injection 50 mcg  50 mcg IntraVENous Q10 Min PRN Jhonny Cahvira MD        HYDROmorphone (DILAUDID) injection 0.5 mg  0.5 mg IntraVENous Q10 Min PRN Jhonny Chavira MD        HYDROcodone-acetaminophen [facility]) 5-325 MG per tablet 1 tablet  1 tablet Oral PRN Jhonny Chavira MD        Or    HYDROcodone-acetaminophen [facility]) 5-325 MG per tablet 2 tablet  2 tablet Oral PRN Jhonny Chavira MD        ondansetron [facility]) injection 4 mg  4 mg IntraVENous Once PRN Jhonny Chavira MD        metoclopramide [facility]) injection 10 mg  10 mg IntraVENous Once PRN Jhonny Chavira MD        diphenhydrAMINE (BENADRYL) injection 12.5 mg  12.5 mg IntraVENous Once PRN Jhonny Chavira MD        ondansetron (ZOFRAN-ODT) disintegrating tablet 4 mg  4 mg Oral Q8H PRN Deedee Khan DO        Or    ondansetron [facility]) injection 4 mg  4 mg IntraVENous Q6H PRN Deedee Hansonar, DO        polyethylene glycol (GLYCOLAX) packet 17 g 17 g Oral Daily PRN Bethanne Mayor Sedar, DO        acetaminophen (TYLENOL) tablet 650 mg  650 mg Oral Q6H PRN Bethanne Mayor Sedar, DO   650 mg at 09/12/21 2319    Or    acetaminophen (TYLENOL) suppository 650 mg  650 mg Rectal Q6H PRN Bethanne Mayor Sedar, DO        piperacillin-tazobactam (ZOSYN) 3,375 mg in dextrose 5 % 50 mL IVPB extended infusion (mini-bag)  3,375 mg IntraVENous Q8H Tavo D Sedar, DO 12.5 mL/hr at 09/13/21 1339 3,375 mg at 09/13/21 1339    morphine (PF) injection 1 mg  1 mg IntraVENous Q4H PRN Bethanne Mayor Sedar, DO        Or    morphine (PF) injection 2 mg  2 mg IntraVENous Q4H PRN Bethanne Mayor Sedar, DO   2 mg at 09/13/21 1012    vancomycin (VANCOCIN) intermittent dosing (placeholder)   Other RX Placeholder Izabel SOLARES Sedar, DO           Allergies:  No Known Allergies    Problem List:    Patient Active Problem List   Diagnosis Code    Abscess of right hand L02.511       Past Medical History:        Diagnosis Date    [facility] abuse ([facility] [address]     Hep C w/o coma, chronic (HCC)        Past Surgical History:        Procedure Laterality Date    HERNIA REPAIR         Social History:    Social History     Tobacco Use    Smoking status: Current Every Day Smoker     Packs/day: 1.00     Types: Cigarettes    Smokeless tobacco: Never Used   Substance Use Topics    Alcohol use: Not Currently                                Ready to quit: Not Answered  Counseling given: Not Answered      Vital Signs (Current):   Vitals:    09/12/21 0400 09/12/21 0439 09/12/21 1916 09/13/21 0044   BP: 125/70 118/70 117/71 137/86   Pulse:  66 65 75   Resp:  18 18 18   Temp:  98.6 °F (37 °C) 97.9 °F (36.6 °C) 97.9 °F (36.6 °C)   TempSrc:  Oral Oral Oral   SpO2: 100% 99% 100% 100%   Weight:       Height:                                                  BP Readings from Last 3 Encounters:   09/13/21 137/86       NPO Status:                                                                                 BMI:   Wt Readings from Last 3 Encounters:   09/11/21 140 lb (63.5 kg)     Body mass index is 22.6 kg/m². CBC:   Lab Results   Component Value Date    WBC 15.1 09/12/2021    RBC 5.44 09/12/2021    HGB 14.6 09/12/2021    HCT 43.4 09/12/2021    MCV 79.7 09/12/2021    RDW 14.1 09/12/2021     09/12/2021       CMP:   Lab Results   Component Value Date     09/12/2021    K 4.3 09/12/2021    CL 99 09/12/2021    CO2 28 09/12/2021    BUN 14 09/12/2021    CREATININE 0.76 09/12/2021    GFRAA >60.0 09/12/2021    LABGLOM >60.0 09/12/2021    GLUCOSE 93 09/12/2021    PROT 8.5 09/12/2021    CALCIUM 9.8 09/12/2021    BILITOT 0.3 09/12/2021    ALKPHOS 130 09/12/2021    AST 20 09/12/2021    ALT 22 09/12/2021       POC Tests: No results for input(s): POCGLU, POCNA, POCK, POCCL, POCBUN, POCHEMO, POCHCT in the last 72 hours. Coags:   Lab Results   Component Value Date    PROTIME 11.1 08/18/2013    INR 1.1 08/18/2013    APTT 30.0 08/18/2013       HCG (If Applicable): No results found for: PREGTESTUR, PREGSERUM, HCG, HCGQUANT     ABGs: No results found for: PHART, PO2ART, IWK2RPE, BZF1MHP, BEART, N1EMAVNG     Type & Screen (If Applicable):  No results found for: LABABO, LABRH    Drug/Infectious Status (If Applicable):  No results found for: HIV, HEPCAB    COVID-19 Screening (If Applicable):   Lab Results   Component Value Date    COVID19 Not Detected 09/13/2021           Anesthesia Evaluation  Patient summary reviewed and Nursing notes reviewed no history of anesthetic complications:   Airway: Mallampati: II  TM distance: >3 FB   Neck ROM: full  Mouth opening: > = 3 FB Dental: normal exam         Pulmonary:Negative Pulmonary ROS and normal exam                               Cardiovascular:Negative CV ROS          ECG reviewed                        Neuro/Psych:   Negative Neuro/Psych ROS              GI/Hepatic/Renal:   (+) hepatitis: C, liver disease:,           Endo/Other:              Pt had PAT visit. Abdominal:             Vascular: negative vascular ROS.          Other Findings:             Anesthesia Plan      general     ASA 3       Induction: intravenous. MIPS: Prophylactic antiemetics administered. Anesthetic plan and risks discussed with patient. Plan discussed with CRNA.     Attending anesthesiologist reviewed and agrees with Preprocedure content              Pat Alejandre MD   9/13/2021

## 2021-09-13 NOTE — PROGRESS NOTES
Patient did elect to stay for surgical drainage of his complex abscess of his right hand. Risk and benefits of surgical intervention were discussed with patient including not limited to damage to nerves, tendons, vessels, persistence of infection, persistent pain, cardiopulmonary complications associate with the procedure and anesthesia with goals of surgery to decompress abscess and allow for better penetration of antibiotics and local wound care. With knowledge of these risk and benefits patient is electing to proceed.

## 2021-09-13 NOTE — PROGRESS NOTES
Pharmacy Vancomycin Consult     Vancomycin Day: 2  Current Dosin mg q12h    Temp 24hr max:  98.6 F    Recent Labs     21  0015   BUN 14   CREATININE 0.76   WBC 15.1*     No intake or output data in the 24 hours ending 21 1233  Cultures  Recent Labs     21  0141 21  0140   BC  --  No Growth to date. Any change in status will be called. BLOODCULT2 No Growth to date. Any change in status will be called. --      Height: 5' 6\" (167.6 cm), Weight: 140 lb (63.5 kg), Body mass index is 22.6 kg/m². Estimated Creatinine Clearance: 108 mL/min (based on SCr of 0.76 mg/dL). .    Trough:  Recent Labs     21  0759   VANCORANDOM 9.4*      Assessment/Plan:  Random level returned at 9.4, which is slightly lower than expected. [facility] is now predicting that his current dose will be subtherapeutic. Increasing the dose to 1250 mg q12h, Bayesian model predicts with a good fit:      mg/L*hr   Trough concentration at steady state 12.2 mg/L   Probability AUC is therapeutic 77%   Probability trough >20 mg/L 9%   Probability of nephrotoxicity 7%     Therefore, will increase dose to 1250 mg q12h. Anticipate next level to be with morning labs on 9/15. Timing of future trough levels may be adjusted based on culture results, renal function, and clinical response.     Thank you,    Sara OdomD  PGY-1 Pharmacy Resident  2021 12:36 PM

## 2021-09-13 NOTE — CONSULTS
Subjective:      Patient ID: Junie Soulier is a 51 y.o. male who presents today for:  Chief Complaint   Patient presents with    Joint Swelling     right hand at heroin injection site        HPI  Patient is admitted due to diffuse cellulitis and hand pain and swelling about his right upper extremity. Patient underwent appropriate care and evaluation including administration of broad-spectrum IV antibiotics with vancomycin and Rocephin. Patient has had significant improvement in his cellulitis overall. Has had noted localization of swelling over the base of his first ray consistent with abscess as documented on CT scan. Patient was made n.p.o. at midnight and plan was for surgical intervention in the form of I&D of the right hand. As I went to evaluate patient and discussed with him surgical options for the day patient became extremely aggressive and stated \"for cough am not an animal I am leaving by 1:00pm unless you do surgery. \"  I informed patient that this was a busy hospital and that there already several patients ahead of him but I have him scheduled to follow those cases in order to appropriately provide care to this individual.  Patient has already been refusing multiple other studies and becoming increasingly aggressive towards staff.     Past Medical History:   Diagnosis Date    Drug abuse ([facility] [address]     Hep C w/o coma, chronic (HCC)       Past Surgical History:   Procedure Laterality Date    HERNIA REPAIR       Social History     Socioeconomic History    Marital status: Single     Spouse name: Not on file    Number of children: Not on file    Years of education: Not on file    Highest education level: Not on file   Occupational History    Not on file   Tobacco Use    Smoking status: Current Every Day Smoker     Packs/day: 1.00     Types: Cigarettes    Smokeless tobacco: Never Used   Substance and Sexual Activity    Alcohol use: Not Currently    Drug use: Yes     Frequency: 7.0 times per week Types: IV, Cocaine, Opiates     Sexual activity: Not on file   Other Topics Concern    Not on file   Social History Narrative    Not on file     Social Determinants of Health     Financial Resource Strain:     Difficulty of Paying Living Expenses:    Food Insecurity:     Worried About Running Out of Food in the Last Year:     [address] in the Last Year:    Transportation Needs:     Lack of Transportation (Medical):  Lack of Transportation (Non-Medical):    Physical Activity:     Days of Exercise per Week:     Minutes of Exercise per Session:    Stress:     Feeling of Stress :    Social Connections:     Frequency of Communication with Friends and Family:     Frequency of Social Gatherings with Friends and Family:     Attends [employer] Services:     Active Member of Clubs or Organizations:     Attends Club or Organization Meetings:     Marital Status:    Intimate Partner Violence:     Fear of Current or Ex-Partner:     Emotionally Abused:     Physically Abused:     Sexually Abused:      History reviewed. No pertinent family history. No Known Allergies  No current facility-administered medications on file prior to encounter. No current outpatient medications on file prior to encounter. Review of Systems  Patient refused to answer my review of system questions    Objective:   /86   Pulse 75   Temp 97.9 °F (36.6 °C) (Oral)   Resp 18   Ht 5' 6\" (1.676 m)   Wt 140 lb (63.5 kg)   SpO2 100%   BMI 22.60 kg/m²     Ortho Exam  Right upper extremity: Very minimal to no cellulitis appreciated about the forearm and hand. There is noted localization with fluctuant mass appreciated over the base of the first ray at the [facility]. This is consistent with an abscess. And tender palpation about the region. Patient is performing all cardinal hand movements and respond to touch properly throughout the right upper extremity. Forearm soft and compressible.     Radiographs and Laboratory Studies:     Diagnostic Imaging Studies:    Notable studies including CT scan of the right upper extremity was independently reviewed    Findings are consistent with fat stranding consistent with cellulitis of the right hand and forearm with noted loculated simple abscess appreciated about the subcutaneous tissues on the dorsum of the base of the first metacarpal.  There is no involvement in regards to osteomyelitis of the hand or wrist.    Laboratory Studies:   Lab Results   Component Value Date    WBC 15.1 (H) 09/12/2021    HGB 14.6 09/12/2021    HCT 43.4 09/12/2021    MCV 79.7 (L) 09/12/2021     09/12/2021     No results found for: SEDRATE  No results found for: CRP    Assessment:      Abscess with associated cellulitis right upper extremity right hand       Plan:     Patient with an abscess to his right hand that would benefit from surgical intervention in the form of incision and drainage of the abscess of the right hand to allow for decreasing a bacterial burden allow for antibiotics to appropriately take effect. Patient is already undergone a significant improvement as a result of his current IV antibiotic administration. I have no concern for compartment syndrome and no concern for necrotizing soft tissue infection based on his exam.  Patient is currently n.p.o. and recommend keeping him n.p.o. to provide surgical intervention later this afternoon. Patient made it very clear that he was not consenting to surgery at this standpoint and instead was likely to leave [address]

## 2021-09-13 NOTE — H&P
Heather Mccoy  1975  male  Medical Record Number: [id number]    Patient informed that I am an Infectious Disease physician and permission obtained from the patient to speak in front of any visitors prior to any discussion for HIPPA purposes. HPI:  51 y.o. male R hand abscess/cellulitis s/p IVDU with tobacco use hx  Blood cultures pending and MRI pending  S/p I+D of the hand  admits to drug abuse including heroin cocaine    Hep C positive    Review of Systems: All 14 review of systems were attempted with the patient and are negative other than as stated above      Past Medical History:   Diagnosis Date    [facility]     Hep C w/o coma, chronic ([facility]        Past Surgical History:   Procedure Laterality Date    HERNIA REPAIR         Social History     Socioeconomic History    Marital status: Single     Spouse name: Not on file    Number of children: Not on file    Years of education: Not on file    Highest education level: Not on file   Occupational History    Not on file   Tobacco Use    Smoking status: Current Every Day Smoker     Packs/day: 1.00     Types: Cigarettes    Smokeless tobacco: Never Used   Substance and Sexual Activity    Alcohol use: Not Currently    Drug use: Yes     Frequency: 7.0 times per week     Types: IV, Cocaine, Opiates     Sexual activity: Not on file   Other Topics Concern    Not on file   Social History Narrative    Not on file     Social Determinants of Health     Financial Resource Strain:     Difficulty of Paying Living Expenses:    Food Insecurity:     Worried About Running Out of Food in the Last Year:     Ran Out of Food in the Last Year:    Transportation Needs:     Lack of Transportation (Medical):      Lack of Transportation (Non-Medical):    Physical Activity:     Days of Exercise per Week:     Minutes of Exercise per Session:    Stress:     Feeling of Stress :    Social Connections:     Frequency of Communication with Friends and Family:     Frequency of Social Gatherings with Friends and Family:     Attends [employer] Services:     Active Member of Clubs or Organizations:     Attends Club or Organization Meetings:     Marital Status:    Intimate Partner Violence:     Fear of Current or Ex-Partner:     Emotionally Abused:     Physically Abused:     Sexually Abused:        History reviewed. No pertinent family history. Physical Exam:  Patient is awake  Neck supple  Chest equal expansion  Abdomen ND  Extrem surgical dressing  Expressions symmetrical  No rash  No calf pain      Labs: I have reviewed all lab results by electronic record, including most recent CBC, metabolic panel, and pertinent abnormalities were addressed from an infectious disease perspective. WBC trends are being monitored. Lab Results   Component Value Date     09/12/2021    K 4.3 09/12/2021    CL 99 09/12/2021    CO2 28 09/12/2021    BUN 14 09/12/2021    CREATININE 0.76 09/12/2021    GLUCOSE 93 09/12/2021    CALCIUM 9.8 09/12/2021      Lab Results   Component Value Date    WBC 15.1 (H) 09/12/2021    HGB 14.6 09/12/2021    HCT 43.4 09/12/2021    MCV 79.7 (L) 09/12/2021     09/12/2021       Radiology:   I have reviewed imaging results per electronic record and most pertinent abnormalities are being addressed from an infectious disease standpoint. Assessment:  Right hand abscess  IVDU - heroin    Plan:  Vanco and Zosyn - direct antibiotics with cx results.    HIV test and Hep C RNA  RPR, CRP level    NO PICC LINE    Nella Aguayo D.O.

## 2021-09-14 ENCOUNTER — APPOINTMENT (OUTPATIENT)
Dept: GENERAL RADIOLOGY | Age: 46
DRG: 383 | End: 2021-09-14
Payer: MEDICAID

## 2021-09-14 VITALS
TEMPERATURE: 98.8 F | SYSTOLIC BLOOD PRESSURE: 95 MMHG | BODY MASS INDEX: 22.5 KG/M2 | HEART RATE: 70 BPM | HEIGHT: 66 IN | DIASTOLIC BLOOD PRESSURE: 67 MMHG | RESPIRATION RATE: 18 BRPM | OXYGEN SATURATION: 98 % | WEIGHT: 140 LBS

## 2021-09-14 LAB
ANION GAP SERPL CALCULATED.3IONS-SCNC: 16 MEQ/L (ref 9–15)
BASOPHILS ABSOLUTE: 0 K/UL (ref 0–0.2)
BASOPHILS RELATIVE PERCENT: 0.3 %
BUN BLDV-MCNC: 13 MG/DL (ref 6–20)
C-REACTIVE PROTEIN: 61.8 MG/L (ref 0–5)
CALCIUM SERPL-MCNC: 9.2 MG/DL (ref 8.5–9.9)
CHLORIDE BLD-SCNC: 99 MEQ/L (ref 95–107)
CO2: 21 MEQ/L (ref 20–31)
CREAT SERPL-MCNC: 0.72 MG/DL (ref 0.7–1.2)
EOSINOPHILS ABSOLUTE: 0.3 K/UL (ref 0–0.7)
EOSINOPHILS RELATIVE PERCENT: 3.1 %
GFR AFRICAN AMERICAN: >60
GFR NON-AFRICAN AMERICAN: >60
GLUCOSE BLD-MCNC: 129 MG/DL (ref 70–99)
HCT VFR BLD CALC: 38.4 % (ref 42–52)
HEMOGLOBIN: 12.9 G/DL (ref 14–18)
LYMPHOCYTES ABSOLUTE: 1.9 K/UL (ref 1–4.8)
LYMPHOCYTES RELATIVE PERCENT: 18.5 %
MCH RBC QN AUTO: 26.6 PG (ref 27–31.3)
MCHC RBC AUTO-ENTMCNC: 33.6 % (ref 33–37)
MCV RBC AUTO: 78.9 FL (ref 80–100)
MONOCYTES ABSOLUTE: 0.8 K/UL (ref 0.2–0.8)
MONOCYTES RELATIVE PERCENT: 7.7 %
NEUTROPHILS ABSOLUTE: 7.4 K/UL (ref 1.4–6.5)
NEUTROPHILS RELATIVE PERCENT: 70.4 %
PDW BLD-RTO: 14.3 % (ref 11.5–14.5)
PLATELET # BLD: 309 K/UL (ref 130–400)
POTASSIUM REFLEX MAGNESIUM: 3.9 MEQ/L (ref 3.4–4.9)
RBC # BLD: 4.87 M/UL (ref 4.7–6.1)
RPR: NORMAL
SODIUM BLD-SCNC: 136 MEQ/L (ref 135–144)
WBC # BLD: 10.5 K/UL (ref 4.8–10.8)

## 2021-09-14 PROCEDURE — 85025 COMPLETE CBC W/AUTO DIFF WBC: CPT

## 2021-09-14 PROCEDURE — 86140 C-REACTIVE PROTEIN: CPT

## 2021-09-14 PROCEDURE — 6370000000 HC RX 637 (ALT 250 FOR IP): Performed by: ORTHOPAEDIC SURGERY

## 2021-09-14 PROCEDURE — 2580000003 HC RX 258: Performed by: ORTHOPAEDIC SURGERY

## 2021-09-14 PROCEDURE — 87522 HEPATITIS C REVRS TRNSCRPJ: CPT

## 2021-09-14 PROCEDURE — 87902 NFCT AGT GNTYP ALYS HEP C: CPT

## 2021-09-14 PROCEDURE — 2700000000 HC OXYGEN THERAPY PER DAY

## 2021-09-14 PROCEDURE — 6360000002 HC RX W HCPCS: Performed by: ORTHOPAEDIC SURGERY

## 2021-09-14 PROCEDURE — 80048 BASIC METABOLIC PNL TOTAL CA: CPT

## 2021-09-14 PROCEDURE — 73620 X-RAY EXAM OF FOOT: CPT

## 2021-09-14 PROCEDURE — 87389 HIV-1 AG W/HIV-1&-2 AB AG IA: CPT

## 2021-09-14 PROCEDURE — 86592 SYPHILIS TEST NON-TREP QUAL: CPT

## 2021-09-14 PROCEDURE — 73600 X-RAY EXAM OF ANKLE: CPT

## 2021-09-14 PROCEDURE — 36415 COLL VENOUS BLD VENIPUNCTURE: CPT

## 2021-09-14 RX ADMIN — PIPERACILLIN AND TAZOBACTAM 3375 MG: 3; .375 INJECTION, POWDER, LYOPHILIZED, FOR SOLUTION INTRAVENOUS at 06:54

## 2021-09-14 RX ADMIN — VANCOMYCIN HYDROCHLORIDE 1250 MG: 5 INJECTION, POWDER, LYOPHILIZED, FOR SOLUTION INTRAVENOUS at 01:45

## 2021-09-14 RX ADMIN — MORPHINE SULFATE 2 MG: 2 INJECTION, SOLUTION INTRAMUSCULAR; INTRAVENOUS at 05:18

## 2021-09-14 RX ADMIN — CLONIDINE HYDROCHLORIDE 0.1 MG: 0.1 TABLET ORAL at 10:22

## 2021-09-14 RX ADMIN — MORPHINE SULFATE 2 MG: 2 INJECTION, SOLUTION INTRAMUSCULAR; INTRAVENOUS at 09:16

## 2021-09-14 ASSESSMENT — PAIN SCALES - GENERAL
PAINLEVEL_OUTOF10: 10
PAINLEVEL_OUTOF10: 10

## 2021-09-14 NOTE — PROGRESS NOTES
DAILY PROGRESS NOTE      POD: 1 Incision and drainage right hand abscess secondary to IV drug use    Patient doing well  Vitals:    21 0636   BP: 116/79   Pulse: 70   Resp: 18   Temp: 98.8 °F (37.1 °C)   SpO2: 98%      Temp (24hrs), Av.7 °F (37.1 °C), Min:97.9 °F (36.6 °C), Max:99.2 °F (37.3 °C)       Pain Control Good  No chest pain or shortness of breath. No calf pain    Exam:   Incision(s): surgical dressing in place and to remain in place until 1400 today. At that time, I will remove dressing and packing and assess wound and obtain photographs for chart. Dressing clean, dry, and intact  Operative extremity shows neuro vascular status intact. Flexion and extension intact on operative extremity  Calves soft and non-tender without evidence of DVT. .      Labs reviewed:  CBC:   Recent Labs     21  0015 21  0528   WBC 15.1* 10.5   HGB 14.6 12.9*    309     BMP:    Recent Labs     21  0015 21  0528    136   K 4.3 3.9   CL 99 99   CO2 28 21   BUN 14 13   CREATININE 0.76 0.72   GLUCOSE 93 129*       I&O  I/O last 3 completed shifts: In: 448.5 [IV Piggyback:448.5]  Out: -       Assessment:  Good Post Operative Course. Patient pod1 I/D right hand abscess secondary to IV drug use. Patient is currently receiving IV antibiotics per infectious disease and final atb determination will be made in correspondence to final culture report. Improvement of leukocytosis this morning with WBC count of 10.5. The patient is responding well to use of IV atb for infection. Continue pain control  Continue IV atb per ID  Orthopedics to perform first dressing change at 1400 today and then can be provided by nursing per wound care instructions. Plan:    Continue pain control  Continue IV atb per ID  Orthopedics to perform first dressing change at 1400 today and then can be provided by nursing per wound care instructions.    Patient to follow up as outpatient in orthopedic clinic in 2 weeks for wound check.        Vielka Garcia, APRN - CNP MD  9/14/2021 9:08 AM

## 2021-09-14 NOTE — DISCHARGE INSTR - DIET

## 2021-09-14 NOTE — DISCHARGE INSTR - COC
Continuity of Care Form    Patient Name: Fina Donis   :  1975  MRN:  [id number]    Admit date:  2021  Discharge date:  ***    Code Status Order: Full Code   Advance Directives:      Admitting Physician:  Jen Gamboa DO  PCP: No primary care provider on file. Discharging Nurse: [facility] Unit/Room#: T992/V585-42  Discharging Unit Phone Number: ***    Emergency Contact:   No emergency contact information on file. Past Surgical History:  Past Surgical History:   Procedure Laterality Date    HERNIA REPAIR         Immunization History: There is no immunization history on file for this patient. Active Problems:  Patient Active Problem List   Diagnosis Code    Abscess of right hand L02.511       Isolation/Infection:   Isolation            No Isolation          Patient Infection Status       None to display            Nurse Assessment:  Last Vital Signs: /79   Pulse 70   Temp 98.8 °F (37.1 °C) (Oral)   Resp 18   Ht 5' 6\" (1.676 m)   Wt 140 lb (63.5 kg)   SpO2 98%   BMI 22.60 kg/m²     Last documented pain score (0-10 scale): Pain Level: 10  Last Weight:   Wt Readings from Last 1 Encounters:   21 140 lb (63.5 kg)     Mental Status:  {IP PT MENTAL STATUS::::0}    IV Access:  { KRISTEN IV ACCESS:563708898:::0}    Nursing Mobility/ADLs:  Walking   {CHP DME ADLs:745104685:::0}  Transfer  {CHP DME ADLs:199299500:::0}  Bathing  {CHP DME ADLs:999693920:::0}  Dressing  {CHP DME ADLs:028625111:::0}  Toileting  {CHP DME ADLs:632393006:::0}  Feeding  {CHP DME ADLs:926468733:::0}  Med Admin  {P DME ADLs:946609785:::0}  Med Delivery   { KRISTEN MED Delivery:890770054:::0}    Wound Care Documentation and Therapy:        Elimination:  Continence:    Bowel: {YES / LY:47171}  Bladder: {YES / ZL:81573}  Urinary Catheter: {Urinary Catheter:574510794:::0}   Colostomy/Ileostomy/Ileal Conduit: {YES / QA:21986}       Date of Last BM: ***    Intake/Output Summary (Last 24 hours) at 2021 0913  Last data filed at 2021 1758  Gross per 24 hour   Intake 448.54 ml   Output --   Net 448.54 ml     I/O last 3 completed shifts:   In: 448.5 [IV Piggyback:448.5]  Out: -     Safety Concerns:     [address] Safety Concerns:966526858:::0}    Impairments/Disabilities:      [address] Impairments/Disabilities:140312831:::0}    Nutrition Therapy:  Current Nutrition Therapy:   [address] KRISTEN Diet List:772415197:::0}    Routes of Feeding: {CHP DME Other Feedings:697185765:::0}  Liquids: {Slp liquid thickness:54272}  Daily Fluid Restriction: {CHP DME Yes amt example:886007346:::0}  Last Modified Barium Swallow with Video (Video Swallowing Test): {Done Not Done JMSW:636405030:::4}    Treatments at the Time of Hospital Discharge:   Respiratory Treatments: ***  Oxygen Therapy:  {Therapy; copd oxygen:76738:::0}  Ventilator:    {[facility] Vent List:256009676:::0}    Rehab Therapies: {THERAPEUTIC INTERVENTION:1609079757}  Weight Bearing Status/Restrictions: {[facility] Weight Bearin:::0}  Other Medical Equipment (for information only, NOT a DME order):  {EQUIPMENT:506769812}  Other Treatments: ***    Patient's personal belongings (please select all that are sent with patient):  {[facility] DME Belongings:046195729:::0}    RN SIGNATURE:  {Esignature:575566740:::0}    CASE MANAGEMENT/SOCIAL WORK SECTION    Inpatient Status Date: ***    Readmission Risk Assessment Score:  Readmission Risk              Risk of Unplanned Readmission:  8           Discharging to Facility/ Agency   Name:   Address:  Phone:  Fax:    Dialysis Facility (if applicable)   Name:  Address:  Dialysis Schedule:  Phone:  Fax:    / signature: {Esignature:253953965:::0}    PHYSICIAN SECTION    Prognosis: {Prognosis:4873949269:::0}    Condition at Discharge: [address] Patient Condition:131867631:::0}    Rehab Potential (if transferring to Rehab): {Prognosis:6081995859:::0}    Recommended Labs or Other Treatments After Discharge:   Orthopedic Recommendations  Begin soak with half hydrogen peroxide and half water with wet-to-dry dressing applied to the wound opening. Physician Certification: I certify the above information and transfer of Marcie Archie  is necessary for the continuing treatment of the diagnosis listed and that he requires {Admit to Appropriate Level of Care:66532:::0} for {GREATER/LESS:251192462} 30 days.      Update Admission H&P: {CHP DME Changes in HandP:546970466:::0}    PHYSICIAN SIGNATURE:  {Esignature:737833254:::0}

## 2021-09-14 NOTE — PROGRESS NOTES
Due to the patient being agitated and wanting to leave against medical advise, his surgical dressing was removed earlier than planned time for 1400. The wound was assessed and images were obtained with verbal consent of the patient using Haiku and have been uploaded to the media section of the patient chart and provided below for review. The patient is to have wet to dry dressing changes daily and 20 minute soaks using 1/2 strength peroxide and water. I have discussed the importance of daily dressing changes for wound healing purposes with patient. Whether he follows these instructions or not this information has been provided to him including the risks of not caring for wound appropriately including, delayed wound healing, worsening infection that may require a second surgery, and risk for systemic infection. Media Information     Document Information    Wound      09/14/2021 10:27   Attached To: Hospital Encounter on 9/11/21   Source Information    SUNSHINE Beach CNP  [facility]     Media Information     Document Information    Wound      09/14/2021 10:27   Attached To: Hospital Encounter on 9/11/21   Source Information    SUNSHINE Beach CNP  [facility]         Recommend daily wound care per order. Please provide patient with a few supplies to be able to do these dressing changes at home. Atb per infectious disease. Follow up in orthopedic office in one week. Chart was reviewed, case was discussed, and imaging independently reviewed in regards to patient's decompressed abscess. Appears adequately decompressed and can follow-up in office.

## 2021-09-14 NOTE — PROGRESS NOTES
Physician Progress Note    2021   12:46 PM    Name:  Roger Gramajo  MRN:    [id number]     IP Day: 2     Admit Date: 2021 11:37 PM  PCP: No primary care provider on file. Code Status:  Full Code      Assessment and Plan: Active Problems/ diagnosis:      right upper extremity/right hand abscess and cellulitis-status post I&D on 2021 by Ortho  IV drug use  High risk for leaving AGAINST MEDICAL ADVICE-intact and oriented x3 and able to tell me why he is in the hospital and able to explain the risks of leaving without completing the treatment    Plan  · Continue broad-spectrum antibiotic, seen by infectious disease and orthopedics. · Follow-up preoperative culture. I explained to the patient that this is needed prior to finalizing antibiotics for discharge. · Patient is high risk for leaving AGAINST MEDICAL ADVICE-at this time, I do not have medical or psychiatric reason to hold him against as well if he chooses to leave [address]  · Orthopedic surgery is following  · Resume as needed pain control    DVT PPx     Subjective:     Has left foot pain. X-ray showed soft tissue swelling without any new process. Physical Examination:      Vitals:  /78   Pulse 70   Temp 98.8 °F (37.1 °C) (Oral)   Resp 18   Ht 5' 6\" (1.676 m)   Wt 140 lb (63.5 kg)   SpO2 98%   BMI 22.60 kg/m²   Temp (24hrs), Av.7 °F (37.1 °C), Min:97.9 °F (36.6 °C), Max:99.2 °F (37.3 °C)      General appearance: alert, cooperative and no distress  Mental Status: oriented to person, place and time and normal affect  Lungs: clear to auscultation bilaterally, normal effort  Heart: regular rate and rhythm, no murmur  Abdomen: soft, nontender, nondistended, bowel sounds present, no masses  Extremities: no edema, redness, tenderness in the calves  Skin: Right hand erythema and swelling with signs of abscess. Pulses intact.     Data:     Labs:  Recent Labs     21  0015 21  0528   WBC 15.1* 10.5 HGB 14.6 12.9*    309     Recent Labs     09/12/21  0015 09/14/21  0528    136   K 4.3 3.9   CL 99 99   CO2 28 21   BUN 14 13   CREATININE 0.76 0.72   GLUCOSE 93 129*     Recent Labs     09/12/21  0015   AST 20   ALT 22   BILITOT 0.3   ALKPHOS 130*       Current Facility-Administered Medications   Medication Dose Route Frequency Provider Last Rate Last Admin    traMADol (ULTRAM) tablet 50 mg  50 mg Oral PRN Alka Nelson MD        And    cloNIDine (CATAPRES) tablet 0.1 mg  0.1 mg Oral PRN Alka Nelson MD   0.1 mg at 09/14/21 1022    vancomycin (VANCOCIN) 1,250 mg in dextrose 5 % 250 mL IVPB  1,250 mg IntraVENous Q12H Alka Nelson MD   Stopped at 09/14/21 0645    ondansetron (ZOFRAN-ODT) disintegrating tablet 4 mg  4 mg Oral Q8H PRN Alka Nelson MD        Or    ondansetron [facility]) injection 4 mg  4 mg IntraVENous Q6H PRN Alka Nelson MD        polyethylene glycol [facility]) packet 17 g  17 g Oral Daily PRN Alka Nelson MD        acetaminophen (TYLENOL) tablet 650 mg  650 mg Oral Q6H PRN Akla Nelson MD   650 mg at 09/12/21 2319    Or    acetaminophen (TYLENOL) suppository 650 mg  650 mg Rectal Q6H PRN Alka Nelson MD        piperacillin-tazobactam (ZOSYN) 3,375 mg in dextrose 5 % 50 mL IVPB extended infusion (mini-bag)  3,375 mg IntraVENous Sincere Sanders MD   Stopped at 09/14/21 0904    morphine (PF) injection 1 mg  1 mg IntraVENous Q4H PRN Alka Nelson MD        Or    morphine (PF) injection 2 mg  2 mg IntraVENous Q4H PRN Alka Nelson MD   2 mg at 09/14/21 0984    vancomycin (VANCOCIN) intermittent dosing (placeholder)   Other Mukund Chatman MD           Additional work up or/and treatment plan may be added today or then after based on clinical progression. I am managing a portion of pt care. Some medical issues are handled by other specialists.  Additional work up and treatment should be done in out pt setting by pt PCP and other out pt providers. In addition to examining and evaluating pt, I spent additional time explaining care, normaland abnormal findings, and treatment plan. All of pt questions were answered. Counseling, diet and education were provided. Case will be discussed with nursing staff when appropriate. Family will be updated if and when appropriate.        Electronically signed by Lovely Ying DO on 9/14/2021 at 12:46 PM

## 2021-09-14 NOTE — FLOWSHEET NOTE
Pt vss, medicated for pain as ordered first thing a.m, pt didn't eat much but was calm.  Now is yelling on phone and wants to leave Waylon Webb to brain Steinberg and RN have explained dangers of leaving ama at this time, medicating per COWS at this time, nicotine patch also offered and pt declined cont to monitor, pt keeps saying \"I need dope\" Electronically signed by Luigi Puckett RN on 9/14/2021 at 10:15 AM  Pt woke up jumped up and said hes leaving, explained to him that I could give him medication for his withdrawal, he made a phone call and proceeded to begin walking out of his room, advised pt that I would grab his form to leave, that he needed to follow up with Dr Alicja de la paz in one week, removed IV, did a quick wet to dry dressing on hand per orders pt signed form and took off for elevator, msg to Dr Electronically signed by Luigi Puckett RN on 9/14/2021 at 1:33 PM

## 2021-09-14 NOTE — FLOWSHEET NOTE
Pt. Is complaining of pain in the inner part of his left foot, also states the right foot has pain on the outer right side. Left foot has visible swelling and bruising, both feet have visible track marks from drug use where he is complaining of pain. JAYCE Cannon was on the floor and I asked her to look at the patients foot, she order a xray of the left foot.  [facility]  Electronically signed by Madalyn Monae RN on 9/14/2021

## 2021-09-15 LAB — HIV AG/AB: NONREACTIVE

## 2021-09-16 ENCOUNTER — HOSPITAL ENCOUNTER (EMERGENCY)
Age: 46
Discharge: HOME | End: 2021-09-16
Attending: EMERGENCY MEDICINE
Payer: MEDICAID

## 2021-09-16 VITALS
DIASTOLIC BLOOD PRESSURE: 92 MMHG | BODY MASS INDEX: 22.6 KG/M2 | HEART RATE: 87 BPM | RESPIRATION RATE: 18 BRPM | SYSTOLIC BLOOD PRESSURE: 141 MMHG | OXYGEN SATURATION: 99 % | TEMPERATURE: 97.8 F | WEIGHT: 140 LBS

## 2021-09-16 DIAGNOSIS — L03.119 CELLULITIS OF ANKLE: Primary | ICD-10-CM

## 2021-09-16 LAB
ANAEROBIC CULTURE: ABNORMAL
ANAEROBIC CULTURE: ABNORMAL
BASOPHILS ABSOLUTE: 0 K/UL (ref 0–0.2)
BASOPHILS RELATIVE PERCENT: 0.4 %
CULTURE SURGICAL: ABNORMAL
EOSINOPHILS ABSOLUTE: 0.2 K/UL (ref 0–0.7)
EOSINOPHILS RELATIVE PERCENT: 2.3 %
GRAM STAIN RESULT: ABNORMAL
GRAM STAIN RESULT: ABNORMAL
HCT VFR BLD CALC: 38.9 % (ref 42–52)
HEMOGLOBIN: 13.1 G/DL (ref 14–18)
LYMPHOCYTES ABSOLUTE: 1.7 K/UL (ref 1–4.8)
LYMPHOCYTES RELATIVE PERCENT: 17.4 %
MCH RBC QN AUTO: 26.9 PG (ref 27–31.3)
MCHC RBC AUTO-ENTMCNC: 33.7 % (ref 33–37)
MCV RBC AUTO: 79.8 FL (ref 80–100)
MONOCYTES ABSOLUTE: 0.6 K/UL (ref 0.2–0.8)
MONOCYTES RELATIVE PERCENT: 6.5 %
NEUTROPHILS ABSOLUTE: 7.3 K/UL (ref 1.4–6.5)
NEUTROPHILS RELATIVE PERCENT: 73.4 %
ORGANISM: ABNORMAL
PDW BLD-RTO: 14 % (ref 11.5–14.5)
PLATELET # BLD: 332 K/UL (ref 130–400)
RBC # BLD: 4.88 M/UL (ref 4.7–6.1)
URIC ACID, SERUM: 4 MG/DL (ref 3.4–7)
WBC # BLD: 9.9 K/UL (ref 4.8–10.8)

## 2021-09-16 PROCEDURE — 36415 COLL VENOUS BLD VENIPUNCTURE: CPT

## 2021-09-16 PROCEDURE — 6370000000 HC RX 637 (ALT 250 FOR IP): Performed by: NURSE PRACTITIONER

## 2021-09-16 PROCEDURE — 85025 COMPLETE CBC W/AUTO DIFF WBC: CPT

## 2021-09-16 PROCEDURE — 84550 ASSAY OF BLOOD/URIC ACID: CPT

## 2021-09-16 PROCEDURE — 99283 EMERGENCY DEPT VISIT LOW MDM: CPT

## 2021-09-16 RX ORDER — SULFAMETHOXAZOLE AND TRIMETHOPRIM 800; 160 MG/1; MG/1
1 TABLET ORAL 2 TIMES DAILY
Qty: 20 TABLET | Refills: 0 | Status: SHIPPED | OUTPATIENT
Start: 2021-09-16 | End: 2021-09-26

## 2021-09-16 RX ORDER — IBUPROFEN 800 MG/1
800 TABLET ORAL ONCE
Status: COMPLETED | OUTPATIENT
Start: 2021-09-16 | End: 2021-09-16

## 2021-09-16 RX ADMIN — IBUPROFEN 800 MG: 800 TABLET, FILM COATED ORAL at 17:04

## 2021-09-16 ASSESSMENT — PAIN DESCRIPTION - ORIENTATION: ORIENTATION: LEFT

## 2021-09-16 ASSESSMENT — PAIN DESCRIPTION - LOCATION
LOCATION: FOOT
LOCATION: ANKLE
LOCATION: FOOT

## 2021-09-16 ASSESSMENT — PAIN DESCRIPTION - PAIN TYPE
TYPE: ACUTE PAIN
TYPE: ACUTE PAIN

## 2021-09-16 ASSESSMENT — ENCOUNTER SYMPTOMS
NAUSEA: 0
VOMITING: 0
SORE THROAT: 0
TROUBLE SWALLOWING: 0
COUGH: 0
ABDOMINAL PAIN: 0
BACK PAIN: 0
SHORTNESS OF BREATH: 0
DIARRHEA: 0

## 2021-09-16 ASSESSMENT — PAIN SCALES - GENERAL
PAINLEVEL_OUTOF10: 5
PAINLEVEL_OUTOF10: 5
PAINLEVEL_OUTOF10: 10
PAINLEVEL_OUTOF10: 10

## 2021-09-16 NOTE — ANESTHESIA POSTPROCEDURE EVALUATION
Department of Anesthesiology  Postprocedure Note    Patient: Juan Mclaughlin  MRN: [id number]  YOB: 1975  Date of evaluation: 9/16/2021  Time:  2:55 PM     Procedure Summary     Date: 09/13/21 Room / Location: [address] [place] [address]    Anesthesia Start: 9582 Anesthesia Stop: 1821    Procedure: RIGHT HAND INCISION AND DRAINAGE ROOM 279 (Right ) Diagnosis: (ABSCESS RIGHT HAND)    Surgeons: Jasbir Juan MD Responsible Provider: Armida Nascimento MD    Anesthesia Type: general ASA Status: 3          Anesthesia Type: general    Mago Phase I: Mago Score: 10    Mago Phase II:      Last vitals: Reviewed and per EMR flowsheets.        Anesthesia Post Evaluation    Patient location during evaluation: PACU  Patient participation: complete - patient participated  Level of consciousness: awake and alert  Airway patency: patent  Nausea & Vomiting: no nausea and no vomiting  Complications: no  Cardiovascular status: blood pressure returned to baseline and hemodynamically stable  Respiratory status: acceptable  Hydration status: euvolemic

## 2021-09-16 NOTE — ED PROVIDER NOTES
[address] [facility] ED  eMERGENCY dEPARTMENT eNCOUnter      Pt Name: Britney Verdugo  MRN: [id number]  [place] 1975  Date of evaluation: 9/16/2021  Provider: SUNSHINE Flores CNP      HISTORY OF PRESENT ILLNESS    Britney Verdugo is a 51 y.o. male who presents to the Emergency Department with L ankle pain x 3 days. Patient states he is carlos to bend and walk on the ankle but it is painful. Denies any injury or trauma to the ankle. Pain is moderate. REVIEW OF SYSTEMS       Review of Systems   Constitutional: Negative for activity change, appetite change and fever. HENT: Negative for congestion, drooling, ear pain, sore throat and trouble swallowing. Respiratory: Negative for cough and shortness of breath. Cardiovascular: Negative for chest pain. Gastrointestinal: Negative for abdominal pain, diarrhea, nausea and vomiting. Genitourinary: Negative for dysuria. Musculoskeletal: Negative for arthralgias and back pain. L ankle pain   Skin: Negative for rash. All other systems reviewed and are negative. PAST MEDICAL HISTORY     Past Medical History:   Diagnosis Date    Drug abuse ([facility]     Hep C w/o coma, chronic ([facility]          SURGICAL HISTORY       Past Surgical History:   Procedure Laterality Date    HAND SURGERY Right 9/13/2021    RIGHT HAND INCISION AND DRAINAGE ROOM 279 performed by Elder Montanez MD at [address] [place] [address]       Previous Medications    No medications on file       ALLERGIES     Patient has no known allergies. FAMILY HISTORY     History reviewed. No pertinent family history.        SOCIAL HISTORY       Social History     Socioeconomic History    Marital status: Single     Spouse name: None    Number of children: None    Years of education: None    Highest education level: None   Occupational History    None   Tobacco Use    Smoking status: Current Every Day Smoker     Packs/day: 1.00     Types: Cigarettes    Smokeless tobacco: Never Used   Substance and Sexual Activity    Alcohol use: Not Currently    Drug use: Yes     Frequency: 7.0 times per week     Types: IV, Cocaine, Opiates     Sexual activity: None   Other Topics Concern    None   Social History Narrative    None     Social Determinants of Health     Financial Resource Strain:     Difficulty of Paying Living Expenses:    Food Insecurity:     Worried About Running Out of Food in the Last Year:     Ran Out of Food in the Last Year:    Transportation Needs:     Lack of Transportation (Medical):  Lack of Transportation (Non-Medical):    Physical Activity:     Days of Exercise per Week:     Minutes of Exercise per Session:    Stress:     Feeling of Stress :    Social Connections:     Frequency of Communication with Friends and Family:     Frequency of Social Gatherings with Friends and Family:     Attends Mandaeism Services:     Active Member of Clubs or Organizations:     Attends Club or Organization Meetings:     Marital Status:    Intimate Partner Violence:     Fear of Current or Ex-Partner:     Emotionally Abused:     Physically Abused:     Sexually Abused:        SCREENINGS      @FLOW([id number])@      PHYSICAL EXAM    (up to 7 for level 4, 8 or more for level 5)     ED Triage Vitals [09/16/21 1622]   BP Temp Temp Source Pulse Resp SpO2 Height Weight   (!) 141/92 97.8 °F (36.6 °C) Temporal 87 18 99 % -- 140 lb (63.5 kg)       Physical Exam  Vitals and nursing note reviewed. Constitutional:       Appearance: He is well-developed. HENT:      Head: Normocephalic and atraumatic. Right Ear: External ear normal.      Left Ear: External ear normal.   Eyes:      Conjunctiva/sclera: Conjunctivae normal.      Pupils: Pupils are equal, round, and reactive to light. Cardiovascular:      Rate and Rhythm: Normal rate and regular rhythm.    Pulmonary:      Effort: Pulmonary effort is normal.      Breath sounds: Normal breath sounds. Abdominal:      General: Bowel sounds are normal. There is no distension. Palpations: Abdomen is soft. Tenderness: There is no abdominal tenderness. Musculoskeletal:         General: Normal range of motion. Cervical back: Normal range of motion and neck supple. Left ankle: No swelling or deformity. Tenderness present over the lateral malleolus. Anterior drawer test negative. Normal pulse. Left Achilles Tendon: Normal.        Feet:    Skin:     General: Skin is warm and dry. Neurological:      Mental Status: He is alert and oriented to person, place, and time. Deep Tendon Reflexes: Reflexes are normal and symmetric. Psychiatric:         Judgment: Judgment normal.           All other labs were within normal range or not returned as of this dictation. EMERGENCY DEPARTMENT COURSE and DIFFERENTIALDIAGNOSIS/MDM:   Vitals:    Vitals:    09/16/21 1622   BP: (!) 141/92   Pulse: 87   Resp: 18   Temp: 97.8 °F (36.6 °C)   TempSrc: Temporal   SpO2: 99%   Weight: 140 lb (63.5 kg)            51 yr old male with L ankle cellulitis. Patient CBC and uric acid were WNL. I did order some additional lab work that patient refused. He was given a prescription for Bactrim DS. He was instructed to F/U with his PCP in 1-2 days or return to the ER if symptoms worsen. Patient ambulated briskly at discharge and verbalizes understanding. PROCEDURES:  Unless otherwise noted below, none     Procedures      FINAL IMPRESSION      1.  Cellulitis of ankle          DISPOSITION/PLAN   DISPOSITION Decision To Discharge 09/16/2021 07:27:14 PM          SUNSHINE Barajas CNP (electronically signed)  Attending Emergency Physician     SUNSHINE Barajas CNP  09/16/21 1937

## 2021-09-16 NOTE — ED NOTES
Pt refusing further lab draws due to \"feeling much better. \" Pt would like to be discharged at this time. KRAIG Bhardwaj Mention notified.       Coleen Ayers RN  09/16/21 1927

## 2021-09-16 NOTE — ED NOTES
Discharge instructions given. Verbalized understanding. A&Ox4. Pt states pain is 2/10. Ambulates from ED with steady gait.       Trice Coles RN  09/16/21 1939

## 2021-09-16 NOTE — ED TRIAGE NOTES
Pt to ER with c/o left ankle pain and swelling, pt states he had surgery on Monday and when he woke up from surgery and tried to walk to the bathroom he felt a pain in his foot, pt states they did xray here but he signed out ama before they got the results, pt states pain is 10/10, pt a&ox4, resp even and unlabored

## 2021-09-17 LAB
BLOOD CULTURE, ROUTINE: NORMAL
CULTURE, BLOOD 2: NORMAL
HCV QNT BY NAAT IU/ML: ABNORMAL
HCV QNT BY NAAT LOG IU/ML: 6.98 LOG IU/ML
INTERPRETATION: DETECTED

## 2021-09-19 LAB — HEPATITIS C GENOTYPE: NORMAL

## (undated) DEVICE — HAND II: Brand: MEDLINE INDUSTRIES, INC.

## (undated) DEVICE — BANDAGE COMPR W2INXL5YD WHT BGE POLY COT M E WRP WV HK AND

## (undated) DEVICE — CORD,CAUTERY,BIPOLAR,STERILE: Brand: MEDLINE

## (undated) DEVICE — PADDING UNDERCAST W4INXL12FT RAYON POLY SYN NONADHESIVE

## (undated) DEVICE — GOWN,AURORA,NONREINFORCED,LARGE: Brand: MEDLINE

## (undated) DEVICE — COVER LT HNDL BLU PLAS

## (undated) DEVICE — SET,IRRIGATION,CYSTO/TUR: Brand: MEDLINE

## (undated) DEVICE — 1010 S-DRAPE TOWEL DRAPE 10/BX: Brand: STERI-DRAPE™

## (undated) DEVICE — DRESSING GZ W1XL8IN COT XRFRM N ADH OVERWRAP CURAD

## (undated) DEVICE — INTENDED FOR TISSUE SEPARATION, AND OTHER PROCEDURES THAT REQUIRE A SHARP SURGICAL BLADE TO PUNCTURE OR CUT.: Brand: BARD-PARKER ® CARBON RIB-BACK BLADES

## (undated) DEVICE — GLOVE ORANGE PI 7 1/2   MSG9075

## (undated) DEVICE — NEPTUNE E-SEP SMOKE EVACUATION PENCIL, COATED, 70MM BLADE, PUSH BUTTON SWITCH: Brand: NEPTUNE E-SEP

## (undated) DEVICE — COTTON UNDERCAST PADDING,REGULAR FINISH: Brand: WEBRIL

## (undated) DEVICE — SPONGE GZ W4XL4IN COT 12 PLY TYP VII WVN C FLD DSGN

## (undated) DEVICE — SPONGE,LAP,18"X18",DLX,XR,ST,5/PK,40/PK: Brand: MEDLINE

## (undated) DEVICE — GLOVE ORANGE PI 7   MSG9070

## (undated) DEVICE — ZIMMER® STERILE DISPOSABLE TOURNIQUET CUFF, DUAL PORT, SINGLE BLADDER, 18 IN. (46 CM)